# Patient Record
Sex: MALE | Race: WHITE | NOT HISPANIC OR LATINO | Employment: FULL TIME | URBAN - METROPOLITAN AREA
[De-identification: names, ages, dates, MRNs, and addresses within clinical notes are randomized per-mention and may not be internally consistent; named-entity substitution may affect disease eponyms.]

---

## 2018-03-08 ENCOUNTER — OFFICE VISIT CONVERTED (OUTPATIENT)
Dept: ORTHOPEDIC SURGERY | Facility: CLINIC | Age: 48
End: 2018-03-08
Attending: ORTHOPAEDIC SURGERY

## 2018-04-16 ENCOUNTER — OFFICE VISIT CONVERTED (OUTPATIENT)
Dept: ORTHOPEDIC SURGERY | Facility: CLINIC | Age: 48
End: 2018-04-16
Attending: ORTHOPAEDIC SURGERY

## 2018-05-25 ENCOUNTER — CONVERSION ENCOUNTER (OUTPATIENT)
Dept: ORTHOPEDIC SURGERY | Facility: CLINIC | Age: 48
End: 2018-05-25

## 2018-05-25 ENCOUNTER — OFFICE VISIT CONVERTED (OUTPATIENT)
Dept: ORTHOPEDIC SURGERY | Facility: CLINIC | Age: 48
End: 2018-05-25
Attending: ORTHOPAEDIC SURGERY

## 2019-07-12 ENCOUNTER — HOSPITAL ENCOUNTER (OUTPATIENT)
Dept: OTHER | Facility: HOSPITAL | Age: 49
Discharge: HOME | End: 2019-07-12
Attending: NURSE PRACTITIONER

## 2019-07-12 ENCOUNTER — OFFICE VISIT CONVERTED (OUTPATIENT)
Dept: INTERNAL MEDICINE | Facility: CLINIC | Age: 49
End: 2019-07-12
Attending: NURSE PRACTITIONER

## 2019-07-12 LAB
T4 FREE SERPL-MCNC: 1.2 NG/DL (ref 0.9–1.8)
TSH SERPL-ACNC: 2.93 M[IU]/L (ref 0.27–4.2)

## 2019-12-26 ENCOUNTER — OFFICE VISIT CONVERTED (OUTPATIENT)
Dept: INTERNAL MEDICINE | Facility: CLINIC | Age: 49
End: 2019-12-26
Attending: NURSE PRACTITIONER

## 2019-12-26 ENCOUNTER — HOSPITAL ENCOUNTER (OUTPATIENT)
Dept: OTHER | Facility: HOSPITAL | Age: 49
Discharge: HOME | End: 2019-12-26
Attending: NURSE PRACTITIONER

## 2019-12-26 LAB
T4 FREE SERPL-MCNC: 1.2 NG/DL (ref 0.9–1.8)
TSH SERPL-ACNC: 2.66 M[IU]/L (ref 0.27–4.2)

## 2020-01-23 ENCOUNTER — HOSPITAL ENCOUNTER (OUTPATIENT)
Dept: URGENT CARE | Facility: CLINIC | Age: 50
Discharge: HOME | End: 2020-01-23
Attending: EMERGENCY MEDICINE

## 2020-01-25 LAB — BACTERIA SPEC AEROBE CULT: NORMAL

## 2020-01-31 ENCOUNTER — HOSPITAL ENCOUNTER (OUTPATIENT)
Dept: URGENT CARE | Facility: CLINIC | Age: 50
Discharge: HOME | End: 2020-01-31
Attending: NURSE PRACTITIONER

## 2020-02-20 ENCOUNTER — HOSPITAL ENCOUNTER (OUTPATIENT)
Dept: URGENT CARE | Facility: CLINIC | Age: 50
Discharge: HOME | End: 2020-02-20
Attending: PHYSICIAN ASSISTANT

## 2020-08-17 ENCOUNTER — HOSPITAL ENCOUNTER (OUTPATIENT)
Dept: OTHER | Facility: HOSPITAL | Age: 50
Discharge: HOME | End: 2020-08-17
Attending: NURSE PRACTITIONER

## 2020-08-18 ENCOUNTER — TELEMEDICINE CONVERTED (OUTPATIENT)
Dept: INTERNAL MEDICINE | Facility: CLINIC | Age: 50
End: 2020-08-18
Attending: NURSE PRACTITIONER

## 2020-08-18 LAB
T4 FREE SERPL-MCNC: 1.2 NG/DL (ref 0.9–1.8)
TSH SERPL-ACNC: 2.9 M[IU]/L (ref 0.27–4.2)

## 2020-11-03 ENCOUNTER — HOSPITAL ENCOUNTER (OUTPATIENT)
Dept: OTHER | Facility: HOSPITAL | Age: 50
Discharge: HOME | End: 2020-11-03
Attending: NURSE PRACTITIONER

## 2020-11-03 ENCOUNTER — OFFICE VISIT CONVERTED (OUTPATIENT)
Dept: INTERNAL MEDICINE | Facility: CLINIC | Age: 50
End: 2020-11-03
Attending: NURSE PRACTITIONER

## 2020-11-03 LAB
ALBUMIN SERPL-MCNC: 4.4 G/DL (ref 3.5–5)
ALBUMIN/GLOB SERPL: 1.6 {RATIO} (ref 1.4–2.6)
ALP SERPL-CCNC: 74 U/L (ref 53–128)
ALT SERPL-CCNC: 44 U/L (ref 10–40)
ANION GAP SERPL CALC-SCNC: 13 MMOL/L (ref 8–19)
AST SERPL-CCNC: 34 U/L (ref 15–50)
BASOPHILS # BLD AUTO: 0.05 10*3/UL (ref 0–0.2)
BASOPHILS NFR BLD AUTO: 0.7 % (ref 0–3)
BILIRUB SERPL-MCNC: 0.43 MG/DL (ref 0.2–1.3)
BUN SERPL-MCNC: 21 MG/DL (ref 5–25)
BUN/CREAT SERPL: 16 {RATIO} (ref 6–20)
CALCIUM SERPL-MCNC: 9.6 MG/DL (ref 8.7–10.4)
CHLORIDE SERPL-SCNC: 105 MMOL/L (ref 99–111)
CHOLEST SERPL-MCNC: 178 MG/DL (ref 107–200)
CHOLEST/HDLC SERPL: 4.8 {RATIO} (ref 3–6)
CONV ABS IMM GRAN: 0.01 10*3/UL (ref 0–0.2)
CONV CO2: 24 MMOL/L (ref 22–32)
CONV IMMATURE GRAN: 0.1 % (ref 0–1.8)
CONV TOTAL PROTEIN: 7.1 G/DL (ref 6.3–8.2)
CREAT UR-MCNC: 1.32 MG/DL (ref 0.7–1.2)
DEPRECATED RDW RBC AUTO: 43.8 FL (ref 35.1–43.9)
EOSINOPHIL # BLD AUTO: 0.13 10*3/UL (ref 0–0.7)
EOSINOPHIL # BLD AUTO: 1.9 % (ref 0–7)
ERYTHROCYTE [DISTWIDTH] IN BLOOD BY AUTOMATED COUNT: 13.1 % (ref 11.6–14.4)
GFR SERPLBLD BASED ON 1.73 SQ M-ARVRAT: >60 ML/MIN/{1.73_M2}
GLOBULIN UR ELPH-MCNC: 2.7 G/DL (ref 2–3.5)
GLUCOSE SERPL-MCNC: 109 MG/DL (ref 70–99)
HCT VFR BLD AUTO: 51.2 % (ref 42–52)
HDLC SERPL-MCNC: 37 MG/DL (ref 40–60)
HGB BLD-MCNC: 16.4 G/DL (ref 14–18)
LDLC SERPL CALC-MCNC: 118 MG/DL (ref 70–100)
LYMPHOCYTES # BLD AUTO: 1.78 10*3/UL (ref 1–5)
LYMPHOCYTES NFR BLD AUTO: 26.4 % (ref 20–45)
MCH RBC QN AUTO: 29.3 PG (ref 27–31)
MCHC RBC AUTO-ENTMCNC: 32 G/DL (ref 33–37)
MCV RBC AUTO: 91.6 FL (ref 80–96)
MONOCYTES # BLD AUTO: 0.48 10*3/UL (ref 0.2–1.2)
MONOCYTES NFR BLD AUTO: 7.1 % (ref 3–10)
NEUTROPHILS # BLD AUTO: 4.28 10*3/UL (ref 2–8)
NEUTROPHILS NFR BLD AUTO: 63.8 % (ref 30–85)
NRBC CBCN: 0 % (ref 0–0.7)
OSMOLALITY SERPL CALC.SUM OF ELEC: 290 MOSM/KG (ref 273–304)
PLATELET # BLD AUTO: 329 10*3/UL (ref 130–400)
PMV BLD AUTO: 10 FL (ref 9.4–12.4)
POTASSIUM SERPL-SCNC: 4.4 MMOL/L (ref 3.5–5.3)
RBC # BLD AUTO: 5.59 10*6/UL (ref 4.7–6.1)
SODIUM SERPL-SCNC: 138 MMOL/L (ref 135–147)
TRIGL SERPL-MCNC: 115 MG/DL (ref 40–150)
TSH SERPL-ACNC: 4 M[IU]/L (ref 0.27–4.2)
VLDLC SERPL-MCNC: 23 MG/DL (ref 5–37)
WBC # BLD AUTO: 6.73 10*3/UL (ref 4.8–10.8)

## 2021-02-02 ENCOUNTER — CONVERSION ENCOUNTER (OUTPATIENT)
Dept: INTERNAL MEDICINE | Facility: CLINIC | Age: 51
End: 2021-02-02

## 2021-02-02 ENCOUNTER — OFFICE VISIT CONVERTED (OUTPATIENT)
Dept: INTERNAL MEDICINE | Facility: CLINIC | Age: 51
End: 2021-02-02
Attending: NURSE PRACTITIONER

## 2021-02-02 ENCOUNTER — HOSPITAL ENCOUNTER (OUTPATIENT)
Dept: OTHER | Facility: HOSPITAL | Age: 51
Discharge: HOME | End: 2021-02-02
Attending: NURSE PRACTITIONER

## 2021-04-20 ENCOUNTER — HOSPITAL ENCOUNTER (OUTPATIENT)
Dept: OTHER | Facility: HOSPITAL | Age: 51
Discharge: HOME | End: 2021-04-20
Attending: NURSE PRACTITIONER

## 2021-04-20 LAB
ALBUMIN SERPL-MCNC: 4.2 G/DL (ref 3.5–5)
ALBUMIN/GLOB SERPL: 1.4 {RATIO} (ref 1.4–2.6)
ALP SERPL-CCNC: 73 U/L (ref 53–128)
ALT SERPL-CCNC: 36 U/L (ref 10–40)
ANION GAP SERPL CALC-SCNC: 13 MMOL/L (ref 8–19)
AST SERPL-CCNC: 25 U/L (ref 15–50)
BASOPHILS # BLD AUTO: 0.06 10*3/UL (ref 0–0.2)
BASOPHILS NFR BLD AUTO: 0.6 % (ref 0–3)
BILIRUB SERPL-MCNC: 0.27 MG/DL (ref 0.2–1.3)
BUN SERPL-MCNC: 15 MG/DL (ref 5–25)
BUN/CREAT SERPL: 13 {RATIO} (ref 6–20)
CALCIUM SERPL-MCNC: 9.6 MG/DL (ref 8.7–10.4)
CHLORIDE SERPL-SCNC: 102 MMOL/L (ref 99–111)
CHOLEST SERPL-MCNC: 158 MG/DL (ref 107–200)
CHOLEST/HDLC SERPL: 4.2 {RATIO} (ref 3–6)
CONV ABS IMM GRAN: 0.03 10*3/UL (ref 0–0.2)
CONV CO2: 27 MMOL/L (ref 22–32)
CONV IMMATURE GRAN: 0.3 % (ref 0–1.8)
CONV TOTAL PROTEIN: 7.2 G/DL (ref 6.3–8.2)
CREAT UR-MCNC: 1.2 MG/DL (ref 0.7–1.2)
DEPRECATED RDW RBC AUTO: 42.3 FL (ref 35.1–43.9)
EOSINOPHIL # BLD AUTO: 0.11 10*3/UL (ref 0–0.7)
EOSINOPHIL # BLD AUTO: 1.1 % (ref 0–7)
ERYTHROCYTE [DISTWIDTH] IN BLOOD BY AUTOMATED COUNT: 12.9 % (ref 11.6–14.4)
GFR SERPLBLD BASED ON 1.73 SQ M-ARVRAT: >60 ML/MIN/{1.73_M2}
GLOBULIN UR ELPH-MCNC: 3 G/DL (ref 2–3.5)
GLUCOSE SERPL-MCNC: 116 MG/DL (ref 70–99)
HCT VFR BLD AUTO: 51 % (ref 42–52)
HDLC SERPL-MCNC: 38 MG/DL (ref 40–60)
HGB BLD-MCNC: 16.8 G/DL (ref 14–18)
LDLC SERPL CALC-MCNC: 73 MG/DL (ref 70–100)
LYMPHOCYTES # BLD AUTO: 2.5 10*3/UL (ref 1–5)
LYMPHOCYTES NFR BLD AUTO: 24.3 % (ref 20–45)
MCH RBC QN AUTO: 29.7 PG (ref 27–31)
MCHC RBC AUTO-ENTMCNC: 32.9 G/DL (ref 33–37)
MCV RBC AUTO: 90.3 FL (ref 80–96)
MONOCYTES # BLD AUTO: 0.69 10*3/UL (ref 0.2–1.2)
MONOCYTES NFR BLD AUTO: 6.7 % (ref 3–10)
NEUTROPHILS # BLD AUTO: 6.89 10*3/UL (ref 2–8)
NEUTROPHILS NFR BLD AUTO: 67 % (ref 30–85)
NRBC CBCN: 0 % (ref 0–0.7)
OSMOLALITY SERPL CALC.SUM OF ELEC: 288 MOSM/KG (ref 273–304)
PLATELET # BLD AUTO: 326 10*3/UL (ref 130–400)
PMV BLD AUTO: 9.9 FL (ref 9.4–12.4)
POTASSIUM SERPL-SCNC: 4.2 MMOL/L (ref 3.5–5.3)
RBC # BLD AUTO: 5.65 10*6/UL (ref 4.7–6.1)
SODIUM SERPL-SCNC: 138 MMOL/L (ref 135–147)
TRIGL SERPL-MCNC: 237 MG/DL (ref 40–150)
TSH SERPL-ACNC: 4.31 M[IU]/L (ref 0.27–4.2)
VLDLC SERPL-MCNC: 47 MG/DL (ref 5–37)
WBC # BLD AUTO: 10.28 10*3/UL (ref 4.8–10.8)

## 2021-04-21 ENCOUNTER — HOSPITAL ENCOUNTER (OUTPATIENT)
Dept: OTHER | Facility: HOSPITAL | Age: 51
Setting detail: RECURRING SERIES
Discharge: HOME | End: 2021-04-26
Attending: NURSE PRACTITIONER

## 2021-05-13 NOTE — PROGRESS NOTES
"   Progress Note      Patient Name: Sivakumar Gray   Patient ID: [id number]   Sex: Male   YOB: 1970    Primary Care Provider: Luz GE    Visit Date: August 18, 2020    Provider: JOO Cole   Location: [facility]   Location Address: [address] [place] [address], [address]  [place], [place]  [id number]   Location Phone: [phone]          Chief Complaint  · \"I would like to discuss changing my medication\"      History Of Present Illness  Video Conferencing Visit  Sivakumar Gray is a 50 year old male who is presenting for evaluation via video conferencing via [employer]. Verbal consent obtained before beginning visit.   The following staff were present during this visit: Luz Minor NP.   Sivakumar Gray is a 50 year old male who presents for evaluation and treatment of:      He has some concerns regarding Synthroid.  He states that since being on it he feels like in the afternoon, he becomes very fatigued.  He feels like this is related to Synthroid.  He is talk to both his mother and brother who had similar issues with synthetic thyroid replacement.  He reports that they switch to natural thyroid hormone and feels significantly better.    Other than the fatigue, he denies any other symptoms         Past Medical History  Disease Name Date Onset Notes   ACL tear, left 03/09/2018 --    Aftercare following Left knee arthroscopic revision ACL reconstruction with allograft and partial medial meniscectomy and chondroplasty 04/03/2018 05/26/2018 --    Lateral meniscus tear, Left 03/09/2018 --    Left knee pain, unspecified chronicity 03/09/2018 --    Limb Swelling --  --    Medial meniscus tear, Left 03/09/2018 --    Shortness of Breath --  --    Thyroid disorder --  --          Medication List  Name Date Started Instructions   clotrimazole 1 % topical cream 07/12/2019 apply to the affected and surrounding areas of skin by topical route 2 times per day in the morning and evening "   Synthroid 75 mcg oral tablet 01/16/2020 take 1 tablet (75 mcg) by oral route once daily for 90 days         Allergy List  Allergen Name Date Reaction Notes   amoxicillin --  --  --          Family Medical History  Disease Name Relative/Age Notes   Family history of certain chronic disabling diseases; arthritis Sister/   Sister         Social History  Finding Status Start/Stop Quantity Notes   Alcohol Use Current some day --/-- --  rarely drinks, less than 1 drink per day, has been drinking for 21-30 years   lives with spouse --  --/-- --  --    . --  --/-- --  --    Recreational Drug Use Never --/-- --  no   Tobacco Never --/-- --  never smoker   Working --  --/-- --  --          Immunizations  NameDate Admin Mfg Trade Name Lot Number Route Inj VIS Given VIS Publication   [id number]/17/2019 [facility] Fluzone Quadrivalent [id number] IM LD 10/17/2019    Comments: pt tolerated well         Review of Systems  · Constitutional  o Denies  o : fever, fatigue, weight loss, weight gain  · Cardiovascular  o Denies  o : lower extremity edema, claudication, chest pressure, palpitations  · Respiratory  o Denies  o : shortness of breath, wheezing, cough, hemoptysis, dyspnea on exertion  · Gastrointestinal  o Denies  o : nausea, vomiting, diarrhea, constipation, abdominal pain      Physical Examination     Gen: well-nourished, no acute distress  HENT: atraumatic, normocephalic  Eyes: extraocular movements intact, no scleral icterus  Lung: breathing comfortably, no cough  Skin: no visible rash, no lesions  Neuro: grossly oriented to person, place, and time. no facial droop   Psych: normal mood and affect             Assessment  · Hypothyroidism     244.9/E03.9  We will trial him on Platinum Thyroid at 30 mg. We will stop the Synthroid and start Platinum. We will repeat thyroid levels in 6 to 8 weeks and have a follow-up at that time.    Problems Reconciled  Plan  · Orders  o Thyroid Profile (59217, 95128, THYII) - 244.9/E03.9 -  09/29/2020  o ACO-39: Current medications updated and reviewed () - - 08/18/2020  · Medications  o [place] Thyroid 30 mg oral tablet   SIG: take 1 tablet (30 mg) by oral route once daily for 90 days   DISP: (90) tablets with 0 refills  Prescribed on 08/18/2020     o Synthroid 75 mcg oral tablet   SIG: take 1 tablet (75 mcg) by oral route once daily for 90 days   DISP: (90) tablets with 1 refills  Discontinued on 08/18/2020     o Medications have been Reconciled  o Transition of Care or Provider Policy  · Instructions  o Patient was educated/instructed on their diagnosis, treatment and medications prior to discharge from the clinic today.  o Call the office with any concerns or questions.  · Disposition  o Call or Return if symptoms worsen or persist.  o Follow up in 2 months  o Labs to be printed  o Prescriptions sent electronically            Electronically Signed by: JOO Cole -Author on August 18, 2020 08:27:47 AM

## 2021-05-13 NOTE — PROGRESS NOTES
Progress Note      Patient Name: Sivakumar Gray   Patient ID: [id number]   Sex: Male   YOB: 1970    Primary Care Provider: Luz GE    Visit Date: November 3, 2020    Provider: JOO Cole   Location: [facility]   Location Address: [address] [place] [address], [facility]  [place], [place]  [id number]   Location Phone: [phone]          Chief Complaint  · Hypothyroidism   · annual physica      History Of Present Illness  Sivakumar Gray is a 50 year old male who presents for evaluation and treatment of:      annual physical    left knee pain, he has hx of ACL repair. He reports feeling like it his knee is buckling in the last few wks. he has been using ice. taking aleve prn. improved since initial injury. it is not bothering him to the point he is will to have surgery on this. no issue going up and down stairs    since changing from Synthroid, he has not had as much hairloss and did lose some weight. feeling much better on armour    already had flu vaccine       Past Medical History  Disease Name Date Onset Notes   ACL tear, left 03/09/2018 --    Aftercare following Left knee arthroscopic revision ACL reconstruction with allograft and partial medial meniscectomy and chondroplasty 04/03/2018 05/26/2018 --    Lateral meniscus tear, Left 03/09/2018 --    Left knee pain, unspecified chronicity 03/09/2018 --    Limb Swelling --  --    Medial meniscus tear, Left 03/09/2018 --    Shortness of Breath --  --    Thyroid disorder --  --          Medication List  Name Date Started Instructions   [place] Thyroid 30 mg oral tablet 11/03/2020 take 1 tablet (30 mg) by oral route once daily for 90 days   clotrimazole 1 % topical cream 07/12/2019 apply to the affected and surrounding areas of skin by topical route 2 times per day in the morning and evening         Allergy List  Allergen Name Date Reaction Notes   amoxicillin --  --  --        Allergies Reconciled  Family Medical  "History  Disease Name Relative/Age Notes   Family history of certain chronic disabling diseases; arthritis Sister/   Sister         Social History  Finding Status Start/Stop Quantity Notes   Alcohol Use Current some day --/-- --  rarely drinks, less than 1 drink per day, has been drinking for 21-30 years   lives with spouse --  --/-- --  --    . --  --/-- --  --    Recreational Drug Use Never --/-- --  no   Tobacco Never --/-- --  never smoker   Working --  --/-- --  --          Immunizations  NameDate Admin Mfg Trade Name Lot Number Route Inj VIS Given VIS Publication   [id number]/17/2019 [facility] Fluzone Quadrivalent [id number] IM LD 10/17/2019    Comments: pt tolerated well         Review of Systems  · Constitutional  o Denies  o : fever, fatigue, weight loss, weight gain  · Cardiovascular  o Denies  o : lower extremity edema, claudication, chest pressure, palpitations  · Respiratory  o Denies  o : shortness of breath, wheezing, cough, hemoptysis, dyspnea on exertion  · Gastrointestinal  o Denies  o : nausea, vomiting, diarrhea, constipation, abdominal pain      Vitals  Date Time BP Position Site L\R Cuff Size HR RR TEMP (F) WT  HT  BMI kg/m2 BSA m2 O2 Sat FR L/min FiO2 HC       07/12/2019 02:35 /82 Sitting    73 - R 15 97.6 203lbs 16oz 5'  6.5\" 32.43 2.08 96 %      12/26/2019 08:40 /66 Sitting    77 - R 14 98.6 200lbs 6oz 5'  6\" 32.34 2.06 95 %  21%    11/03/2020 08:48 /80 Sitting    77 - R  97.6 198lbs 2oz 5'  6\" 31.98 2.05 96 %  21%          Physical Examination  · Constitutional  o Appearance  o : no acute distress, well-nourished  · Head and Face  o Head  o :   § Inspection  § : atraumatic, normocephalic  · Ears, Nose, Mouth and Throat  o Ears  o :   § External Ears  § : normal  § Otoscopic Examination  § : tympanic membrane appearance within normal limits bilaterally  o Nose  o :   § Intranasal Exam  § : nares patent  o Oral Cavity  o :   § Oral Mucosa  § : moist mucous " membranes  · Neck  o Thyroid  o : gland size normal, nontender, no nodules or masses present on palpation, symmetric  · Respiratory  o Respiratory Effort  o : breathing comfortably, symmetric chest rise  o Auscultation of Lungs  o : clear to asculatation bilaterally, no wheezes, rales, or rhonchii  · Cardiovascular  o Heart  o :   § Auscultation of Heart  § : regular rate and rhythm, no murmurs, rubs, or gallops  o Peripheral Vascular System  o :   § Extremities  § : no edema  · Lymphatic  o Neck  o : no lymphadenopathy present  · Skin and Subcutaneous Tissue  o General Inspection  o : no lesions present, no areas of discoloration, skin turgor normal  · Neurologic  o Mental Status Examination  o :   § Orientation  § : grossly oriented to person, place and time  o Gait and Station  o :   § Gait Screening  § : normal gait          Assessment  · Annual physical exam     V70.0/Z00.00  will check labs today  · Hypothyroidism     244.9/E03.9  labs today, doing well with armour  · Left knee pain, unspecified chronicity     719.46/M25.562  he will continue with ice, PT on his own, bracing, and nsaids prn. If pain worsens, he will call for further eval with imaging.    Problems Reconciled  Plan  · Orders  o Physical, Primary Care Panel (CBC, CMP, Lipid, TSH) [facility] (21594, 58378, 54094, 33431) - V70.0/Z00.00 - 11/03/2020  o ACO-39: Current medications updated and reviewed (1159F, ) - - 11/03/2020  · Medications  o Medications have been Reconciled  o Transition of Care or Provider Policy  · Instructions  o Reviewed health maintenance flowsheet and updated information. Orders were placed and/or patient's response was documented.  o Patient was educated/instructed on their diagnosis, treatment and medications prior to discharge from the clinic today.  o Call the office with any concerns or questions.  · Disposition  o Call or Return if symptoms worsen or persist.  o labs drawn in house today            Electronically Signed  by: Luz Minor, APRN -Author on December 15, 2020 05:28:35 PM

## 2021-05-14 VITALS
SYSTOLIC BLOOD PRESSURE: 122 MMHG | HEART RATE: 70 BPM | DIASTOLIC BLOOD PRESSURE: 76 MMHG | TEMPERATURE: 97.8 F | WEIGHT: 201.37 LBS | BODY MASS INDEX: 32.36 KG/M2 | HEIGHT: 66 IN | OXYGEN SATURATION: 95 %

## 2021-05-14 VITALS
SYSTOLIC BLOOD PRESSURE: 116 MMHG | OXYGEN SATURATION: 96 % | HEART RATE: 77 BPM | BODY MASS INDEX: 31.84 KG/M2 | TEMPERATURE: 97.6 F | HEIGHT: 66 IN | WEIGHT: 198.12 LBS | DIASTOLIC BLOOD PRESSURE: 80 MMHG

## 2021-05-14 NOTE — PROGRESS NOTES
"   Progress Note      Patient Name: Sivakumar Gray   Patient ID: [id number]   Sex: Male   YOB: 1970    Primary Care Provider: Luz GE    Visit Date: February 2, 2021    Provider: JOO Cole   Location: [facility]   Location Address: [address] [place] [address], [address]  [place], [place]  [id number]   Location Phone: [phone]          Chief Complaint  · Acute visit  · \"I need a dermatology referral\"  · \"I've been having some pain in my right knee\"      History Of Present Illness  Sivakumar Gray is a 50 year old male who presents for evaluation and treatment of:      rash present x2 wks, initially thought this was poison ivy that then got infected  was seen in the ED and dx with impetigo. finished steroids and has been using mupirocin  he reports some improvement but is still very inflamed, warm to touch  denies significant pain but reports some itching  already has appt with derm spec but needing referral    knee, right-hurting on medial border x1 mth  denies injury but has been working out, doing squats  pain with walking and especially with stairs  instability with extension during ambulation    AK of left ear       Past Medical History  Disease Name Date Onset Notes   ACL tear, left 03/09/2018 --    Aftercare following Left knee arthroscopic revision ACL reconstruction with allograft and partial medial meniscectomy and chondroplasty 04/03/2018 05/26/2018 --    Lateral meniscus tear, Left 03/09/2018 --    Left knee pain, unspecified chronicity 03/09/2018 --    Limb Swelling --  --    Medial meniscus tear, Left 03/09/2018 --    Shortness of Breath --  --    Thyroid disorder --  --          Medication List  Name Date Started Instructions   [place] Thyroid 30 mg oral tablet 11/03/2020 take 1 tablet (30 mg) by oral route once daily for 90 days   clotrimazole 1 % topical cream 07/12/2019 apply to the affected and surrounding areas of skin by topical route 2 times per day in " "the morning and evening   mupirocin 2 % topical ointment 01/25/2021 apply a small amount to the affected area by topical route 3 times per day for 5 days         Allergy List  Allergen Name Date Reaction Notes   amoxicillin --  --  --        Allergies Reconciled  Family Medical History  Disease Name Relative/Age Notes   Family history of certain chronic disabling diseases; arthritis Sister/   Sister         Social History  Finding Status Start/Stop Quantity Notes   Alcohol Use Current some day --/-- --  rarely drinks, less than 1 drink per day, has been drinking for 21-30 years   lives with spouse --  --/-- --  --    . --  --/-- --  --    Recreational Drug Use Never --/-- --  no   Tobacco Never --/-- --  never smoker   Working --  --/-- --  --          Immunizations  NameDate Admin Mfg Trade Name Lot Number Route Inj VIS Given VIS Publication   [id number]/17/2019 [facility] Fluzone Quadrivalent [id number] IM LD 10/17/2019    Comments: pt tolerated well         Review of Systems  · Constitutional  o Denies  o : fever, fatigue, weight loss, weight gain  · Cardiovascular  o Denies  o : lower extremity edema, claudication, chest pressure, palpitations  · Respiratory  o Denies  o : shortness of breath, wheezing, cough, hemoptysis, dyspnea on exertion  · Gastrointestinal  o Denies  o : nausea, vomiting, diarrhea, constipation, abdominal pain      Vitals  Date Time BP Position Site L\R Cuff Size HR RR TEMP (F) WT  HT  BMI kg/m2 BSA m2 O2 Sat FR L/min FiO2 HC       12/26/2019 08:40 /66 Sitting    77 - R 14 98.6 200lbs 6oz 5'  6\" 32.34 2.06 95 %  21%    11/03/2020 08:48 /80 Sitting    77 - R  97.6 198lbs 2oz 5'  6\" 31.98 2.05 96 %  21%    02/02/2021 10:14 /76 Sitting    70 - R  97.8 201lbs 6oz 5'  6\" 32.5 2.06 95 %  21%          Physical Examination  · Constitutional  o Appearance  o : no acute distress, well-nourished  · Head and Face  o Head  o :   § Inspection  § : atraumatic, " normocephalic  · Eyes  o Eyes  o : extraocular movements intact, no scleral icterus, no conjunctival injection  · Ears, Nose, Mouth and Throat  o Ears  o :   § External Ears  § : normal  o Nose  o :   § Intranasal Exam  § : nares patent  o Oral Cavity  o :   § Oral Mucosa  § : moist mucous membranes  · Respiratory  o Respiratory Effort  o : breathing comfortably, symmetric chest rise  o Auscultation of Lungs  o : clear to asculatation bilaterally, no wheezes, rales, or rhonchii  · Cardiovascular  o Heart  o :   § Auscultation of Heart  § : regular rate and rhythm, no murmurs, rubs, or gallops  o Peripheral Vascular System  o :   § Extremities  § : no edema  · Lymphatic  o Neck  o : no lymphadenopathy present  · Skin and Subcutaneous Tissue  o General Inspection  o : AK of left ear, erythema of bilateral forearms with deroofed lesions without drainage, crusting  · Neurologic  o Mental Status Examination  o :   § Orientation  § : grossly oriented to person, place and time  o Gait and Station  o :   § Gait Screening  § : normal gait  · Psychiatric  o General  o : normal mood and affect  · Left Knee  o Inspection  o : no redness, swelling, deformity, bruising, or effusion  o Palpation  o : tenderness to palpation medial joint line, no crepitus  o Range of Motion  o : normal range of motion  o Reflexes  o : normal reflexes          Assessment  · Rash and nonspecific skin eruption     782.1/R21  appt already scheduled with derm. will place referral for ins  · Knee pain, acute     719.46/M25.569  xray in office today. will schedule for PT. home PT until that time.  · Actinic keratitis     370.24/H16.139  Cryotherapy performed today after risk-benefit discussion and consent. Lesions sprayed with liquid nitrogen for 5 pulsatile sec x 3 rounds.     Problems Reconciled  Plan  · Orders  o ACO-39: Current medications updated and reviewed (1159F, ) - - 02/02/2021  o DERMATOLOGY CONSULTATION ([facility]) - 782.1/R21 - 02/02/2021    has appt at derm specialist on 2/4/21, just needing referral  o Knee (Right) 3 views X-Ray [facility] ([id number]) - 719.46/M25.569 - 02/02/2021  o PHYSICAL THERAPY CONSULTATION (formerly [facility]) - 719.46/M25.569 - 02/02/2021  · Medications  o Medications have been Reconciled  o Transition of Care or Provider Policy  · Instructions  o Patient was educated/instructed on their diagnosis, treatment and medications prior to discharge from the clinic today.  o Call the office with any concerns or questions.  · Disposition  o Call or Return if symptoms worsen or persist.  o Xray performed in clinic            Electronically Signed by: JOO Cole -Author on February 2, 2021 01:22:27 PM

## 2021-05-15 VITALS
DIASTOLIC BLOOD PRESSURE: 66 MMHG | WEIGHT: 200.37 LBS | SYSTOLIC BLOOD PRESSURE: 120 MMHG | HEART RATE: 77 BPM | RESPIRATION RATE: 14 BRPM | TEMPERATURE: 98.6 F | HEIGHT: 66 IN | OXYGEN SATURATION: 95 % | BODY MASS INDEX: 32.2 KG/M2

## 2021-05-15 VITALS
BODY MASS INDEX: 32.78 KG/M2 | SYSTOLIC BLOOD PRESSURE: 126 MMHG | RESPIRATION RATE: 15 BRPM | HEART RATE: 73 BPM | OXYGEN SATURATION: 96 % | DIASTOLIC BLOOD PRESSURE: 82 MMHG | WEIGHT: 204 LBS | HEIGHT: 66 IN | TEMPERATURE: 97.6 F

## 2021-05-16 VITALS — HEART RATE: 81 BPM | HEIGHT: 66 IN | OXYGEN SATURATION: 98 %

## 2021-05-16 VITALS — HEIGHT: 66 IN | HEART RATE: 85 BPM | OXYGEN SATURATION: 98 % | WEIGHT: 196.5 LBS | BODY MASS INDEX: 31.58 KG/M2

## 2021-05-16 VITALS — HEART RATE: 80 BPM | WEIGHT: 199.12 LBS | BODY MASS INDEX: 32 KG/M2 | OXYGEN SATURATION: 98 % | HEIGHT: 66 IN

## 2021-06-10 ENCOUNTER — TELEPHONE (OUTPATIENT)
Dept: INTERNAL MEDICINE | Facility: CLINIC | Age: 51
End: 2021-06-10

## 2021-06-10 NOTE — TELEPHONE ENCOUNTER
Discussed with patient wife, appointment to be made for upcoming weeks for shoulder pain that started two months ago

## 2021-06-10 NOTE — TELEPHONE ENCOUNTER
Caller: Sivakumar Gray    Relationship to patient: Self    Best call back number: [phone]    Patient is needing: PATIENT MET SAME DAY REQUIREMENTS BUT THERE WERE NO SAME DAY APPOINTMENTS AVAILABLE. ATTEMPTED TO WARM TRANSFER AND WAS SENT STRAIGHT TO A VOICEMAIL

## 2021-06-16 DIAGNOSIS — E03.9 HYPOTHYROIDISM, UNSPECIFIED TYPE: Primary | ICD-10-CM

## 2021-06-16 NOTE — TELEPHONE ENCOUNTER
Caller: Sivakumar Gray    Relationship: Self    Best call back number:[phone]     Medication needed:   Requested Prescriptions      No prescriptions requested or ordered in this encounter   THYROID ARMOUR 30 MG    When do you need the refill by: 4 DAYS    What additional details did the patient provide when requesting the medication:     Does the patient have less than a 3 day supply:  [] Yes  [x] No    What is the patient's preferred pharmacy: [id number] - [place], [place] - [address] AT [address]/[facility] & [place] - [phone] [facility] [phone] FX

## 2021-06-17 RX ORDER — THYROID 30 MG/1
1 TABLET ORAL DAILY
COMMUNITY
Start: 2021-04-29 | End: 2021-06-17 | Stop reason: SDUPTHER

## 2021-06-17 NOTE — TELEPHONE ENCOUNTER
Luz Patient      LOV: 02/02/21   Last TSH on 04/20/2021 was 4.310 H  Has pending thyroid profile.     Please review and approve/deny medication request.

## 2021-06-18 RX ORDER — THYROID 30 MG/1
30 TABLET ORAL DAILY
Qty: 30 TABLET | Refills: 2 | Status: SHIPPED | OUTPATIENT
Start: 2021-06-18 | End: 2021-08-05 | Stop reason: SDUPTHER

## 2021-07-02 ENCOUNTER — OFFICE VISIT (OUTPATIENT)
Dept: INTERNAL MEDICINE | Facility: CLINIC | Age: 51
End: 2021-07-02

## 2021-07-02 VITALS
SYSTOLIC BLOOD PRESSURE: 128 MMHG | HEIGHT: 66 IN | WEIGHT: 199.5 LBS | HEART RATE: 86 BPM | OXYGEN SATURATION: 98 % | TEMPERATURE: 97.6 F | BODY MASS INDEX: 32.06 KG/M2 | DIASTOLIC BLOOD PRESSURE: 86 MMHG

## 2021-07-02 DIAGNOSIS — E03.9 HYPOTHYROIDISM, UNSPECIFIED TYPE: ICD-10-CM

## 2021-07-02 DIAGNOSIS — E78.2 MIXED HYPERLIPIDEMIA: ICD-10-CM

## 2021-07-02 DIAGNOSIS — S43.432A LABRAL TEAR OF SHOULDER, LEFT, INITIAL ENCOUNTER: ICD-10-CM

## 2021-07-02 DIAGNOSIS — M25.512 ACUTE PAIN OF LEFT SHOULDER: Primary | ICD-10-CM

## 2021-07-02 PROCEDURE — 99214 OFFICE O/P EST MOD 30 MIN: CPT | Performed by: PHYSICIAN ASSISTANT

## 2021-07-02 RX ORDER — NAPROXEN 500 MG/1
500 TABLET ORAL 2 TIMES DAILY WITH MEALS
Qty: 60 TABLET | Refills: 1 | Status: SHIPPED | OUTPATIENT
Start: 2021-07-02 | End: 2022-12-15

## 2021-07-02 NOTE — PROGRESS NOTES
"Chief Complaint  Follow-up, Shoulder Pain (has been hvaing pain for over a month ), and Elbow Pain    Subjective          Sivakumar Gray presents to [facility]  Pt was splitting wood last month and starting having issues in L shoulder  Denies numbness/tingling down into arm  Denies neck pain.   Pain worse when laying on his side or if he tries to lift.   He states pain is sharp. Also has pain with L elbow.   He has been trying PT on how at home.     Hypothyroid: denies cp, palpitations, dizziness  No issues taking medicine    HLD: trying lifestyle changes      Past Medical History:   Diagnosis Date   • Acute lateral meniscal tear, left, initial encounter 03/09/2018   • Condition not found 05/26/2018    aftercare following left knee arthroscopic revision ACL reconstruction with allograft and partial medial meniscectomy and chondroplasty 4/3/2018   • Left ACL tear 03/09/2018   • Left knee pain 03/09/2018    unspecified chronicity   • Limb swelling    • Medial meniscus tear 03/09/2018   • SOB (shortness of breath)    • Thyroid disorder         History reviewed. No pertinent surgical history.     Current Outpatient Medications on File Prior to Visit   Medication Sig Dispense Refill   • [place] Thyroid 30 MG tablet Take 1 tablet by mouth Daily. 30 tablet 2     No current facility-administered medications on file prior to visit.        Allergies   Allergen Reactions   • Amoxicillin Hives       Social History     Tobacco Use   Smoking Status Never Smoker   Smokeless Tobacco Never Used          Objective   Vital Signs:   /86   Pulse 86   Temp 97.6 °F (36.4 °C)   Ht 167.6 cm (66\")   Wt 90.5 kg (199 lb 8 oz)   SpO2 98%   BMI 32.20 kg/m²     Physical Exam  Vitals reviewed.   Constitutional:       Appearance: Normal appearance.   HENT:      Head: Normocephalic and atraumatic.      Nose: Nose normal.      Mouth/Throat:      Mouth: Mucous membranes are moist.   Eyes:      " Extraocular Movements: Extraocular movements intact.      Conjunctiva/sclera: Conjunctivae normal.      Pupils: Pupils are equal, round, and reactive to light.   Cardiovascular:      Rate and Rhythm: Normal rate and regular rhythm.   Pulmonary:      Effort: Pulmonary effort is normal.      Breath sounds: Normal breath sounds.   Abdominal:      General: Abdomen is flat. Bowel sounds are normal.      Palpations: Abdomen is soft.   Musculoskeletal:      Right shoulder: Normal.      Left shoulder: Decreased range of motion. Decreased strength.      Comments: Positive empty can test  C/o pain with abduction of L shoulder   Neurological:      General: No focal deficit present.      Mental Status: He is alert and oriented to person, place, and time.   Psychiatric:         Mood and Affect: Mood normal.        Result Review :                 Assessment and Plan    Diagnoses and all orders for this visit:    1. Acute pain of left shoulder (Primary)  Comments:  Discussed likely rotator cuff injury. XR showing arthritis, will order MRI to further eval. Naproxen prn pain.  Orders:  -     MRI Shoulder Left Without Contrast; Future  -     XR Shoulder 2+ View Left    2. Mixed hyperlipidemia  Assessment & Plan:   Reviewed last labs showing elevated cholesterol. Labs ordered today, will start medication based on results.      Orders:  -     T4, free; Future  -     Comprehensive Metabolic Panel; Future  -     CBC & Differential; Future  -     TSH; Future  -     Lipid Panel; Future    3. Hypothyroidism, unspecified type  Assessment & Plan:  Labs ordered today, will adjust medication based on results.      Orders:  -     T4, free; Future  -     Comprehensive Metabolic Panel; Future  -     CBC & Differential; Future  -     TSH; Future  -     Lipid Panel; Future    Other orders  -     naproxen (Naprosyn) 500 MG tablet; Take 1 tablet by mouth 2 (Two) Times a Day With Meals.  Dispense: 60 tablet; Refill: 1      Follow Up   Return in about 6  weeks (around 8/13/2021).  Patient was given instructions and counseling regarding his condition or for health maintenance advice. Please see specific information pulled into the AVS if appropriate.

## 2021-07-03 NOTE — PATIENT INSTRUCTIONS
Shoulder Pain  Many things can cause shoulder pain, including:  · An injury to the shoulder.  · Overuse of the shoulder.  · Arthritis.  The source of the pain can be:  · Inflammation.  · An injury to the shoulder joint.  · An injury to a tendon, ligament, or bone.  Follow these instructions at home:  Pay attention to changes in your symptoms. Let your health care provider know about them. Follow these instructions to relieve your pain.  If you have a sling:  · Wear the sling as told by your health care provider. Remove it only as told by your health care provider.  · Loosen the sling if your fingers tingle, become numb, or turn cold and blue.  · Keep the sling clean.  · If the sling is not waterproof:  ? Do not let it get wet. Remove it to shower or bathe.  · Move your arm as little as possible, but keep your hand moving to prevent swelling.  Managing pain, stiffness, and swelling    · If directed, put ice on the painful area:  ? Put ice in a plastic bag.  ? Place a towel between your skin and the bag.  ? Leave the ice on for 20 minutes, 2-3 times per day. Stop applying ice if it does not help with the pain.  · Squeeze a soft ball or a foam pad as much as possible. This helps to keep the shoulder from swelling. It also helps to strengthen the arm.  General instructions  · Take over-the-counter and prescription medicines only as told by your health care provider.  · Keep all follow-up visits as told by your health care provider. This is important.  Contact a health care provider if:  · Your pain gets worse.  · Your pain is not relieved with medicines.  · New pain develops in your arm, hand, or fingers.  Get help right away if:  · Your arm, hand, or fingers:  ? Tingle.  ? Become numb.  ? Become swollen.  ? Become painful.  ? Turn white or blue.  Summary  · Shoulder pain can be caused by an injury, overuse, or arthritis.  · Pay attention to changes in your symptoms. Let your health care provider know about  them.  · This condition may be treated with a sling, ice, and pain medicines.  · Contact your health care provider if the pain gets worse or new pain develops. Get help right away if your arm, hand, or fingers tingle or become numb, swollen, or painful.  · Keep all follow-up visits as told by your health care provider. This is important.  This information is not intended to replace advice given to you by your health care provider. Make sure you discuss any questions you have with your health care provider.  Document Revised: 07/02/2019 Document Reviewed: 07/02/2019  Elsevier Patient Education © 2021 [employer]

## 2021-07-03 NOTE — ASSESSMENT & PLAN NOTE
Reviewed last labs showing elevated cholesterol. Labs ordered today, will start medication based on results.

## 2021-07-08 NOTE — PROGRESS NOTES
I have reviewed the notes, assessments, and/or procedures performed by Leighann Dangelo PA-C, I concur with her documentation of Sivakumar Gray.

## 2021-07-23 ENCOUNTER — HOSPITAL ENCOUNTER (OUTPATIENT)
Dept: MRI IMAGING | Facility: HOSPITAL | Age: 51
Discharge: HOME | End: 2021-07-23
Admitting: PHYSICIAN ASSISTANT

## 2021-07-23 DIAGNOSIS — M25.512 ACUTE PAIN OF LEFT SHOULDER: ICD-10-CM

## 2021-07-23 PROCEDURE — 73221 MRI JOINT UPR EXTREM W/O DYE: CPT

## 2021-07-29 ENCOUNTER — OFFICE VISIT (OUTPATIENT)
Dept: ORTHOPEDIC SURGERY | Facility: CLINIC | Age: 51
End: 2021-07-29

## 2021-07-29 VITALS — HEIGHT: 67 IN | RESPIRATION RATE: 14 BRPM | WEIGHT: 186 LBS | BODY MASS INDEX: 29.19 KG/M2

## 2021-07-29 DIAGNOSIS — M77.8 SHOULDER TENDONITIS, LEFT: ICD-10-CM

## 2021-07-29 DIAGNOSIS — M75.112 PARTIAL NONTRAUMATIC TEAR OF ROTATOR CUFF, LEFT: Primary | ICD-10-CM

## 2021-07-29 DIAGNOSIS — M75.42 IMPINGEMENT SYNDROME OF LEFT SHOULDER: ICD-10-CM

## 2021-07-29 PROCEDURE — 99203 OFFICE O/P NEW LOW 30 MIN: CPT | Performed by: ORTHOPAEDIC SURGERY

## 2021-07-29 NOTE — PROGRESS NOTES
"Chief Complaint  Pain of the Left Shoulder     Subjective      Sivakumar Gray presents to [facility] for evaluation of the left shoulder. The patient has been having shoulder pain for about 4 months. He denies any injury or trauma to the shoulder. He reports stiffness and pain in the shoulder. He seen his PCP who ordered an x-ray and an MRI of the shoulder. He has had no treatment for the shoulder. He locates the pain to the anterior lateral shoulder. He denies any popping to the shoulder, admits pain at night. He reports decreased strength.     Allergies   Allergen Reactions   • Amoxicillin Hives        Social History     Socioeconomic History   • Marital status:      Spouse name: Not on file   • Number of children: Not on file   • Years of education: Not on file   • Highest education level: Not on file   Tobacco Use   • Smoking status: Never Smoker   • Smokeless tobacco: Never Used   Vaping Use   • Vaping Use: Never used   Substance and Sexual Activity   • Alcohol use: Yes     Comment: rarely drinks, less than 1 drink per day, has been drinking for 21-30 years   • Drug use: Never        Review of Systems     Objective   Vital Signs:   Resp 14   Ht 168.9 cm (66.5\")   Wt 84.4 kg (186 lb)   BMI 29.57 kg/m²       Physical Exam  Constitutional:       Appearance: Normal appearance. He is well-developed and normal weight.   HENT:      Head: Normocephalic.      Right Ear: Hearing and external ear normal.      Left Ear: Hearing and external ear normal.      Nose: Nose normal.   Eyes:      Conjunctiva/sclera: Conjunctivae normal.   Cardiovascular:      Rate and Rhythm: Normal rate.   Pulmonary:      Effort: Pulmonary effort is normal.      Breath sounds: No wheezing or rales.   Abdominal:      Palpations: Abdomen is soft.      Tenderness: There is no abdominal tenderness.   Musculoskeletal:      Cervical back: Normal range of motion.   Skin:     Findings: No rash.   Neurological:      " Mental Status: He is alert and oriented to person, place, and time.   Psychiatric:         Mood and Affect: Mood and affect normal.         Judgment: Judgment normal.       Ortho Exam      Left shoulder- non-tender. Forward elevation 180. Abduction 165. External Rotation 90. Internal rotation 55 with pain. Positive Vega and Neer. Positive Pike. Internal rotation to T-12. Negative lift off. Mild pain with cross arm adduction.     Procedures      Imaging Results (Most Recent)     None           Result Review :       XR Shoulder 2+ View Left    Result Date: 7/2/2021  Narrative: PROCEDURE: XR SHOULDER 2+ VW LEFT  COMPARISON: None  INDICATIONS: shoulder pain x 2 weeks/nki  FINDINGS:  Mild glenohumeral and AC joint arthrosis.  No fracture or dislocation.  CONCLUSION: Degenerative changes.  No acute findings.      LUCI CALHOUN MD       Electronically Signed and Approved By: LUCI CALHOUN MD on 7/02/2021 at 14:10             MRI Shoulder Left Without Contrast    Result Date: 7/23/2021  Narrative: PROCEDURE: MRI SHOULDER LEFT WO CONTRAST  COMPARISON: None  INDICATIONS: Shoulder pain, rotator cuff disorder suspected, xray done  TECHNIQUE: A variety of imaging planes and parameters were utilized for visualization of suspected pathology.  Images were performed without contrast.   FINDINGS:  Rotator cuff tendinosis is present.  Intermediate signal changes are present with no evidence of a focal defect.  There is a small amount of fraying of the supraspinatus tendon.  Subacromial spurring is present.  There is a small amount of subacromial/subdeltoid bursitis present.  The biceps labral complex is intact. There is no evidence for biceps tendon tear.  A small amount of fluid is seen along the extracapsular portion of the biceps tendon.  Mild to moderate degenerative changes are present within the acromioclavicular joint.  Mild degenerative changes are present within the glenohumeral joint.  No joint effusion identified.  Pan  labral degenerative tearing is present, most pronounced along the posterior labrum which appears mildly displaced.  A. Labral cyst is seen or scar standing from the posterior labrum measuring up to 2.5 x 0.3 cm (series 8, image 6).  This extends along the posterior aspect of the glenoid.  No additional cystic changes identified.  No abnormal focal bone marrow signal is seen. The cortical margins are intact. The volume of the rotator cuff musculature is within normal limits. The surrounding soft tissues are unremarkable.  CONCLUSION:  1. Rotator cuff tendinosis with mild bursal sided fraying of the supraspinatus tendon with a small amount of subacromial/subdeltoid bursitis related to subacromial spurring.  Otherwise, no evidence of tear. 2. Pan labral degenerative tearing, most pronounced along the posterior labrum which demonstrates mild displacement with elongated paralabral cyst formation seen extending along the posterior aspect of the glenoid. 3. Mild to moderate acromioclavicular and mild glenohumeral osteoarthritis. 4. Mild biceps tenosynovitis..     ARTEMIO EDWARDS MD       Electronically Signed and Approved By: ARTEMIO EDWARDS MD on 7/23/2021 at 15:32                      Assessment and Plan     DX: partial rotator cuff tear. Left Shoulder tendonitis. Left shoulder impingement     Discussed the risks and benefits of a Left shoulder injection. The patient expressed understanding and wished to proceed. He tolerated the injection well. Order for physical therapy given today.     Call or return if worsening symptoms.    Follow Up     Follow up in 6 weeks to recheck.       Patient was given instructions and counseling regarding his condition or for health maintenance advice. Please see specific information pulled into the AVS if appropriate.     Scribed for Fabricio Luna MD by Joseline Harding.  07/29/21   16:04 EDT    I have personally performed the services described in this document as scribed by the  above individual and it is both accurate and complete. Fabricio Luna MD 07/29/21

## 2021-08-03 ENCOUNTER — CLINICAL SUPPORT (OUTPATIENT)
Dept: INTERNAL MEDICINE | Facility: CLINIC | Age: 51
End: 2021-08-03

## 2021-08-03 DIAGNOSIS — E03.9 HYPOTHYROIDISM, UNSPECIFIED TYPE: ICD-10-CM

## 2021-08-03 DIAGNOSIS — E78.2 MIXED HYPERLIPIDEMIA: ICD-10-CM

## 2021-08-03 LAB
BASOPHILS # BLD AUTO: 0.04 10*3/MM3 (ref 0–0.2)
BASOPHILS NFR BLD AUTO: 0.4 % (ref 0–1.5)
DEPRECATED RDW RBC AUTO: 42.6 FL (ref 37–54)
EOSINOPHIL # BLD AUTO: 0.04 10*3/MM3 (ref 0–0.4)
EOSINOPHIL NFR BLD AUTO: 0.4 % (ref 0.3–6.2)
ERYTHROCYTE [DISTWIDTH] IN BLOOD BY AUTOMATED COUNT: 12.8 % (ref 12.3–15.4)
HCT VFR BLD AUTO: 53.1 % (ref 37.5–51)
HGB BLD-MCNC: 17.3 G/DL (ref 13–17.7)
IMM GRANULOCYTES # BLD AUTO: 0.03 10*3/MM3 (ref 0–0.05)
IMM GRANULOCYTES NFR BLD AUTO: 0.3 % (ref 0–0.5)
LYMPHOCYTES # BLD AUTO: 2.14 10*3/MM3 (ref 0.7–3.1)
LYMPHOCYTES NFR BLD AUTO: 21.3 % (ref 19.6–45.3)
MCH RBC QN AUTO: 29.3 PG (ref 26.6–33)
MCHC RBC AUTO-ENTMCNC: 32.6 G/DL (ref 31.5–35.7)
MCV RBC AUTO: 90 FL (ref 79–97)
MONOCYTES # BLD AUTO: 0.6 10*3/MM3 (ref 0.1–0.9)
MONOCYTES NFR BLD AUTO: 6 % (ref 5–12)
NEUTROPHILS NFR BLD AUTO: 7.19 10*3/MM3 (ref 1.7–7)
NEUTROPHILS NFR BLD AUTO: 71.6 % (ref 42.7–76)
NRBC BLD AUTO-RTO: 0 /100 WBC (ref 0–0.2)
PLATELET # BLD AUTO: 354 10*3/MM3 (ref 140–450)
PMV BLD AUTO: 10 FL (ref 6–12)
RBC # BLD AUTO: 5.9 10*6/MM3 (ref 4.14–5.8)
WBC # BLD AUTO: 10.04 10*3/MM3 (ref 3.4–10.8)

## 2021-08-03 PROCEDURE — 80053 COMPREHEN METABOLIC PANEL: CPT | Performed by: PHYSICIAN ASSISTANT

## 2021-08-03 PROCEDURE — 84443 ASSAY THYROID STIM HORMONE: CPT | Performed by: PHYSICIAN ASSISTANT

## 2021-08-03 PROCEDURE — 80061 LIPID PANEL: CPT | Performed by: PHYSICIAN ASSISTANT

## 2021-08-03 PROCEDURE — 85025 COMPLETE CBC W/AUTO DIFF WBC: CPT | Performed by: PHYSICIAN ASSISTANT

## 2021-08-03 PROCEDURE — 84439 ASSAY OF FREE THYROXINE: CPT | Performed by: PHYSICIAN ASSISTANT

## 2021-08-03 PROCEDURE — 36415 COLL VENOUS BLD VENIPUNCTURE: CPT | Performed by: NURSE PRACTITIONER

## 2021-08-04 LAB
ALBUMIN SERPL-MCNC: 4.6 G/DL (ref 3.5–5.2)
ALBUMIN/GLOB SERPL: 1.8 G/DL
ALP SERPL-CCNC: 64 U/L (ref 39–117)
ALT SERPL W P-5'-P-CCNC: 27 U/L (ref 1–41)
ANION GAP SERPL CALCULATED.3IONS-SCNC: 8.3 MMOL/L (ref 5–15)
AST SERPL-CCNC: 19 U/L (ref 1–40)
BILIRUB SERPL-MCNC: 0.5 MG/DL (ref 0–1.2)
BUN SERPL-MCNC: 18 MG/DL (ref 6–20)
BUN/CREAT SERPL: 17.1 (ref 7–25)
CALCIUM SPEC-SCNC: 9.8 MG/DL (ref 8.6–10.5)
CHLORIDE SERPL-SCNC: 103 MMOL/L (ref 98–107)
CHOLEST SERPL-MCNC: 158 MG/DL (ref 0–200)
CO2 SERPL-SCNC: 28.7 MMOL/L (ref 22–29)
CREAT SERPL-MCNC: 1.05 MG/DL (ref 0.76–1.27)
GFR SERPL CREATININE-BSD FRML MDRD: 75 ML/MIN/1.73
GLOBULIN UR ELPH-MCNC: 2.6 GM/DL
GLUCOSE SERPL-MCNC: 89 MG/DL (ref 65–99)
HDLC SERPL-MCNC: 57 MG/DL (ref 40–60)
LDLC SERPL CALC-MCNC: 89 MG/DL (ref 0–100)
LDLC/HDLC SERPL: 1.57 {RATIO}
POTASSIUM SERPL-SCNC: 5 MMOL/L (ref 3.5–5.2)
PROT SERPL-MCNC: 7.2 G/DL (ref 6–8.5)
SODIUM SERPL-SCNC: 140 MMOL/L (ref 136–145)
T4 FREE SERPL-MCNC: 0.9 NG/DL (ref 0.93–1.7)
TRIGL SERPL-MCNC: 58 MG/DL (ref 0–150)
TSH SERPL DL<=0.05 MIU/L-ACNC: 3.52 UIU/ML (ref 0.27–4.2)
VLDLC SERPL-MCNC: 12 MG/DL (ref 5–40)

## 2021-09-02 ENCOUNTER — TREATMENT (OUTPATIENT)
Dept: PHYSICAL THERAPY | Facility: CLINIC | Age: 51
End: 2021-09-02

## 2021-09-02 DIAGNOSIS — M75.42 IMPINGEMENT SYNDROME OF LEFT SHOULDER: Primary | ICD-10-CM

## 2021-09-02 DIAGNOSIS — G89.29 CHRONIC LEFT SHOULDER PAIN: ICD-10-CM

## 2021-09-02 DIAGNOSIS — M75.112 PARTIAL NONTRAUMATIC TEAR OF LEFT ROTATOR CUFF: ICD-10-CM

## 2021-09-02 DIAGNOSIS — M25.512 CHRONIC LEFT SHOULDER PAIN: ICD-10-CM

## 2021-09-02 PROCEDURE — 97161 PT EVAL LOW COMPLEX 20 MIN: CPT | Performed by: PHYSICAL THERAPIST

## 2021-09-02 PROCEDURE — 97110 THERAPEUTIC EXERCISES: CPT | Performed by: PHYSICAL THERAPIST

## 2021-09-02 NOTE — PROGRESS NOTES
Physical Therapy Initial Evaluation and Plan of Care        Patient: Sivakumar Gray   : 1970  Diagnosis/ICD-10 Code:  Impingement syndrome of left shoulder [M75.42]  Referring practitioner: Fabricio Luna MD  Date of Initial Visit: 2021  Today's Date: 2021  Patient seen for 1 sessions           Subjective Questionnaire: UEFS: 60/80      Subjective Evaluation    History of Present Illness  Mechanism of injury: Patient reports that he started noticing L shoulder in March.  Patient reports he was doing 200 push ups multiple times a week. Patient reports that he has backed off of heavy lifting and push ups but he continued to have worsening L shoulder pain.  [employer] is left handed.  Patient reports that overhead reaching, lifting, lying on his left side, reaching behind his back increases his pain.  Patient was given an injection which has improved pain.  Patient is taking no pain medication.  Patient is working from his home and reports that his desk set up is not ideal for proper posture. Pt educated on need to improved desk set up in order to decrease pain.     Pain  Current pain ratin  At best pain ratin  At worst pain ratin  Quality: dull ache, discomfort and pulling  Relieving factors: rest and change in position  Aggravating factors: keyboarding, overhead activity, lifting, prolonged positioning, repetitive movement, sleeping and movement  Progression: improved    Social Support  Lives with: spouse    Diagnostic Tests  X-ray: abnormal  MRI studies: abnormal    Treatments  Previous treatment: physical therapy  Patient Goals  Patient goals for therapy: decreased pain, improved balance, increased strength, increased motion, independence with ADLs/IADLs and return to sport/leisure activities             Objective          Static Posture     Head  Forward.    Shoulders  Rounded.    Scapulae  Left protracted and right protracted.    Thoracic Spine  Hyperkyphosis.    Lumbar Spine    Decreased lordosis.     Hip   Hip (Left): Externally rotated.   Hip (Right): Externally rotated.     Tenderness     Left Shoulder   Tenderness in the AC joint, acromion, infraspinatus tendon and supraspinatus tendon. No tenderness in the biceps tendon (proximal) and bicipital groove.     Additional Tenderness Details  L pec minor tightness and tenderness noted    Active Range of Motion   Left Shoulder   Flexion: 144 degrees with pain  Abduction: 105 degrees with pain  External rotation 0°: 39 degrees   Internal rotation BTB: T10 with pain    Right Shoulder   Flexion: 150 degrees   Abduction: 140 degrees   External rotation 0°: 39 degrees   Internal rotation BTB: T8     Passive Range of Motion   Left Shoulder   Flexion: 165 degrees with pain  Abduction: 169 degrees with pain  External rotation 90°: 81 degrees with pain  Internal rotation 90°: 67 degrees with pain    Strength/Myotome Testing     Left Shoulder     Planes of Motion   Flexion: 4   Extension: 4   Abduction: 4-   External rotation at 0°: 4   Internal rotation at 0°: 4     Right Shoulder     Planes of Motion   Flexion: 4+   Extension: 4+   Abduction: 4+   External rotation at 0°: 4+   Internal rotation at 0°: 4+     Left Elbow   Flexion: 5  Extension: 5    Right Elbow   Flexion: 5  Extension: 5    Additional Strength Details  Bilateral scapular stabilizer MMT: 4-/5    Tests     Left Shoulder   Positive AC shear, drop arm, Hawkin's, Neer's and painful arc.   Negative empty can and full can.           Assessment & Plan     Assessment  Impairments: abnormal muscle firing, abnormal muscle tone, abnormal or restricted ROM, activity intolerance, impaired physical strength, lacks appropriate home exercise program, pain with function and safety issue  Assessment details: Patient presents with signs/symptoms consistent with left shoulder impingement syndrome including decreased shoulder ROM, bilateral scapular and left shoulder weakness and reports of pain  limiting function during ADLs as shown on the UEFS. Patient would benefit from skilled PT services in order to address remaining deficits limiting function at this time.    Prognosis: good  Functional Limitations: carrying objects, lifting, sleeping, pulling, pushing, uncomfortable because of pain, reaching behind back, reaching overhead and unable to perform repetitive tasks  Goals  Plan Goals: 1. The patient has limited ROM of the left shoulder.    LTG 1: 12 weeks:  The patient will demonstrate 160 degrees of left shoulder flexion, 150 degrees of shoulder abduction, 50 degrees of shoulder external rotation, and shoulder internal rotation to T8 to allow the patient to reach into upper kitchen cabinets and manipulate clothing behind the back with greater ease.    STATUS:  New    TREATMENT: Manual therapy, therapeutic exercise, home exercise instruction, and modalities as needed to include: electrical stimulation, ultrasound, moist heat, and ice.    2. The patient has limited strength of the left shoulder.   LTG 2: 12 weeks:  The patient will demonstrate 5 /5 strength for left shoulder flexion, abduction, external rotation, and internal rotation in order to demonstrate improved shoulder stability.    STATUS:  New   STG 2a: 6 weeks:  The patient will demonstrate 4+ /5 strength for left shoulder flexion, abduction, external rotation, and internal rotation.    STATUS:  New   STG2b:  4 weeks:  The patient will be independent with home exercises.     STATUS:  New   TREATMENT: Manual therapy, therapeutic exercise, home exercise instruction, and modalities as needed to include: electrical stimulation, ultrasound, moist heat, and ice.     3. The patient complains of pain to the left shoulder.   LTG 3: 12 weeks:  The patient will report a pain rating of 2 /10 or better in order to improve sleep quality and tolerance to performance of activities of daily living.    STATUS:  New   STG 3a: 6 weeks:  The patient will report a  pain rating of 3 /10 or better.      STATUS:  New   TREATMENT: Manual therapy, therapeutic exercise, home exercise instruction, and modalities as needed to include: electrical stimulation, ultrasound, moist heat, and ice.    4. Carrying, Moving, and Handling Objects Functional Limitation     LTG 4: 12 weeks:  The patient will demonstrate 1-19 % limitation by achieving a score of 75 on the UEFS.    STATUS:  New   TREATMENT:  Manual therapy, therapeutic exercise, home exercise instruction, and modalities as needed to include: moist heat, electrical stimulation, and ultrasound.      Plan  Therapy options: will be seen for skilled physical therapy services  Planned modality interventions: cryotherapy, electrical stimulation/Russian stimulation and TENS  Planned therapy interventions: balance/weight-bearing training, ADL retraining, soft tissue mobilization, strengthening, stretching, therapeutic activities, joint mobilization, home exercise program, functional ROM exercises, flexibility, body mechanics training, postural training, neuromuscular re-education, manual therapy, motor coordination training and spinal/joint mobilization  Frequency: 2x week  Duration in weeks: 12  Treatment plan discussed with: patient        Timed:         Manual Therapy:    0     mins  23065;     Therapeutic Exercise:    10     mins  29289;     Neuromuscular Abdoul:    0    mins  25619;    Therapeutic Activity:     0     mins  62712;     Gait Trainin     mins  41670;     Ultrasound:     0     mins  45742;    Ionto                               0    mins   21448  Self-care  __0__ mins 21679    Un-Timed:  Electrical Stimulation:    0     mins  76652 ( );  Traction     0     mins 32818  Low Eval     30     Mins  37926  Mod Eval     0     Mins  99377  High Eval                       0     Mins  88821  Hot pack     0     Mins    Cold pack                       0     Mins      Timed Treatment:   10   mins   Total Treatment:     40    mins    PT SIGNATURE: Najma Ocampo, MANUEL   DATE TREATMENT INITIATED: 9/2/2021    Initial Certification  Certification Period: 12/1/2021  I certify that the therapy services are furnished while this patient is under my care.  The services outlined above are required by this patient, and will be reviewed every 90 days.     PHYSICIAN: Fabricio uLna MD      DATE:     Please sign and return via fax to [phone].. Thank you, [facility].

## 2021-09-07 ENCOUNTER — TREATMENT (OUTPATIENT)
Dept: PHYSICAL THERAPY | Facility: CLINIC | Age: 51
End: 2021-09-07

## 2021-09-07 DIAGNOSIS — G89.29 CHRONIC LEFT SHOULDER PAIN: ICD-10-CM

## 2021-09-07 DIAGNOSIS — M75.42 IMPINGEMENT SYNDROME OF LEFT SHOULDER: Primary | ICD-10-CM

## 2021-09-07 DIAGNOSIS — M75.112 PARTIAL NONTRAUMATIC TEAR OF LEFT ROTATOR CUFF: ICD-10-CM

## 2021-09-07 DIAGNOSIS — M25.512 CHRONIC LEFT SHOULDER PAIN: ICD-10-CM

## 2021-09-07 PROCEDURE — 97110 THERAPEUTIC EXERCISES: CPT | Performed by: PHYSICAL THERAPIST

## 2021-09-07 PROCEDURE — 97140 MANUAL THERAPY 1/> REGIONS: CPT | Performed by: PHYSICAL THERAPIST

## 2021-09-07 NOTE — PROGRESS NOTES
Physical Therapy Daily Progress Note        Patient: Sivakumar Gray   : 1970  Diagnosis/ICD-10 Code:  Impingement syndrome of left shoulder [M75.42]  Referring practitioner: Fabricio Luna MD  Date of Initial Visit: Type: THERAPY  Noted: 2021  Today's Date: 2021  Patient seen for 2 sessions             Subjective   Sivakumar Gray reports: no L shoulder pain today. Patient reports HEP is going well.    Objective   Bilateral scapular stabilizer MMT: 4-/5  See Exercise, Manual, and Modality Logs for complete treatment.       Assessment/Plan  Patient tolerated all exercise progressions and manual therapy well today but continues to demonstrate strength/ROM deficits limiting function. Continue to progress per patient tolerance.    Progress per Plan of Care           Timed:  Manual Therapy:    10     mins  95878;  Therapeutic Exercise:    28     mins  75117;     Neuromuscular Abdoul:    0    mins  30371;    Therapeutic Activity:     0     mins  82385;     Gait Trainin     mins  31708;     Ultrasound:     0     mins  95932;    Electrical Stimulation:    0     mins  16253;  Iontophoresis     0     mins  02340    Untimed:  Electrical Stimulation:    0     mins  04025 ( );  Mechanical Traction:    0     mins  98645;   Fluidotherapy     0     mins  17502  Hot pack     0     Mins    Cold pack                       0     Mins     Timed Treatment:   38   mins   Total Treatment:     38   mins        Najma Ocampo PT  Physical Therapist

## 2021-09-24 ENCOUNTER — TREATMENT (OUTPATIENT)
Dept: PHYSICAL THERAPY | Facility: CLINIC | Age: 51
End: 2021-09-24

## 2021-09-24 DIAGNOSIS — G89.29 CHRONIC LEFT SHOULDER PAIN: ICD-10-CM

## 2021-09-24 DIAGNOSIS — M25.512 CHRONIC LEFT SHOULDER PAIN: ICD-10-CM

## 2021-09-24 DIAGNOSIS — M75.112 PARTIAL NONTRAUMATIC TEAR OF LEFT ROTATOR CUFF: ICD-10-CM

## 2021-09-24 DIAGNOSIS — M75.42 IMPINGEMENT SYNDROME OF LEFT SHOULDER: Primary | ICD-10-CM

## 2021-09-24 PROCEDURE — 97110 THERAPEUTIC EXERCISES: CPT | Performed by: PHYSICAL THERAPIST

## 2021-09-24 NOTE — PROGRESS NOTES
Physical Therapy Daily Progress Note        Patient: Sivakumar Gray   : 1970  Diagnosis/ICD-10 Code:  Impingement syndrome of left shoulder [M75.42]  Referring practitioner: Fabricio Luna MD  Date of Initial Visit: Type: THERAPY  Noted: 2021  Today's Date: 2021  Patient seen for 3 sessions             Subjective   Sivakumar Gray reports: no pain at beginning of session today.    Objective   Bilateral scapular stabilizer MMT: 4-/5  See Exercise, Manual, and Modality Logs for complete treatment.       Assessment/Plan  Patient tolerated all exercise progressions and manual therapy well today but continues to demonstrate strength/ROM deficits limiting function. Continue to progress per patient tolerance.    Progress per Plan of Care           Timed:  Manual Therapy:    5     mins  19923;  Therapeutic Exercise:    25     mins  74653;     Neuromuscular Abdoul:    0    mins  88795;    Therapeutic Activity:     0     mins  54236;     Gait Trainin     mins  41634;     Ultrasound:     0     mins  04178;    Electrical Stimulation:    0     mins  29102;  Iontophoresis     0     mins  29693    Untimed:  Electrical Stimulation:    0     mins  87076 ( );  Mechanical Traction:    0     mins  59440;   Fluidotherapy     0     mins  67323     Timed Treatment:   30   mins   Total Treatment:     30   mins        Najma Ocampo PT  Physical Therapist

## 2021-10-01 ENCOUNTER — TREATMENT (OUTPATIENT)
Dept: PHYSICAL THERAPY | Facility: CLINIC | Age: 51
End: 2021-10-01

## 2021-10-01 DIAGNOSIS — M25.512 CHRONIC LEFT SHOULDER PAIN: ICD-10-CM

## 2021-10-01 DIAGNOSIS — M75.42 IMPINGEMENT SYNDROME OF LEFT SHOULDER: Primary | ICD-10-CM

## 2021-10-01 DIAGNOSIS — M75.112 PARTIAL NONTRAUMATIC TEAR OF LEFT ROTATOR CUFF: ICD-10-CM

## 2021-10-01 DIAGNOSIS — G89.29 CHRONIC LEFT SHOULDER PAIN: ICD-10-CM

## 2021-10-01 PROCEDURE — 97110 THERAPEUTIC EXERCISES: CPT | Performed by: PHYSICAL THERAPIST

## 2021-10-01 NOTE — PROGRESS NOTES
Physical Therapy Daily Progress Note        Patient: Sivakumar Gray   : 1970  Diagnosis/ICD-10 Code:  Impingement syndrome of left shoulder [M75.42]  Referring practitioner: Fabricio Luna MD  Date of Initial Visit: Type: THERAPY  Noted: 2021  Today's Date: 10/1/2021  Patient seen for 4 sessions             Subjective   Sivakumar Gray reports: no pain at beginning of session today.    Objective   Bilateral scapular stabilizer MMT: 4/5  See Exercise, Manual, and Modality Logs for complete treatment.       Assessment/Plan  Patient tolerated all exercise progressions and manual therapy well today but continues to demonstrate strength/ROM deficits limiting function. Continue to progress per patient tolerance.    Progress per Plan of Care           Timed:  Manual Therapy:    6     mins  91827;  Therapeutic Exercise:    26     mins  48125;     Neuromuscular Abdoul:    0    mins  43366;    Therapeutic Activity:     0     mins  89256;     Gait Trainin     mins  05912;     Ultrasound:     0     mins  09503;    Electrical Stimulation:    0     mins  66001;  Iontophoresis     0     mins  23175    Untimed:  Electrical Stimulation:    0     mins  01102 ( );  Mechanical Traction:    0     mins  52367;   Fluidotherapy     0     mins  30472  Hot pack     0     Mins    Cold pack                       0     Mins     Timed Treatment:   32   mins   Total Treatment:     32   mins        Najma Ocampo PT  Physical Therapist

## 2021-10-18 ENCOUNTER — TELEPHONE (OUTPATIENT)
Dept: INTERNAL MEDICINE | Facility: CLINIC | Age: 51
End: 2021-10-18

## 2021-10-18 NOTE — TELEPHONE ENCOUNTER
Caller: Sivakumar Gray    Relationship: Self    Best call back number: [phone]    What orders are you requesting (i.e. lab or imaging): LAB ORDERS     In what timeframe would the patient need to come in: HE WOULD LIKE TO COME IN TOMORROW AND HAVE THIS DONE.     Where will you receive your lab/imaging services: IN THE OFFICE     Additional notes: PATIENT HAS AN APPOINTMENT SCHEDULED FOR 11/4 BUT WOULD LIKE TO HAVE BLOOD WORK DONE TOMORROW FOR HIS TYROID.  PLEASE CALL HIM AND LET HIM KNOW WHEN ORDERS ARE READY FOR HIM.

## 2021-10-20 ENCOUNTER — CLINICAL SUPPORT (OUTPATIENT)
Dept: INTERNAL MEDICINE | Facility: CLINIC | Age: 51
End: 2021-10-20

## 2021-10-20 DIAGNOSIS — E03.9 HYPOTHYROIDISM, UNSPECIFIED TYPE: Primary | ICD-10-CM

## 2021-10-20 LAB
T-UPTAKE NFR SERPL: 1.05 TBI (ref 0.8–1.3)
T4 SERPL-MCNC: 5.72 MCG/DL (ref 4.5–11.7)
TSH SERPL DL<=0.05 MIU/L-ACNC: 4.04 UIU/ML (ref 0.27–4.2)

## 2021-10-20 PROCEDURE — 84436 ASSAY OF TOTAL THYROXINE: CPT | Performed by: NURSE PRACTITIONER

## 2021-10-20 PROCEDURE — 84479 ASSAY OF THYROID (T3 OR T4): CPT | Performed by: NURSE PRACTITIONER

## 2021-10-20 PROCEDURE — 84443 ASSAY THYROID STIM HORMONE: CPT | Performed by: NURSE PRACTITIONER

## 2021-10-20 PROCEDURE — 36415 COLL VENOUS BLD VENIPUNCTURE: CPT | Performed by: NURSE PRACTITIONER

## 2021-11-04 ENCOUNTER — OFFICE VISIT (OUTPATIENT)
Dept: INTERNAL MEDICINE | Facility: CLINIC | Age: 51
End: 2021-11-04

## 2021-11-04 VITALS
SYSTOLIC BLOOD PRESSURE: 132 MMHG | OXYGEN SATURATION: 98 % | DIASTOLIC BLOOD PRESSURE: 82 MMHG | TEMPERATURE: 97.4 F | BODY MASS INDEX: 31.74 KG/M2 | HEIGHT: 67 IN | WEIGHT: 202.25 LBS | HEART RATE: 78 BPM

## 2021-11-04 DIAGNOSIS — Z28.21 COVID-19 VACCINATION DECLINED: ICD-10-CM

## 2021-11-04 DIAGNOSIS — R06.00 DYSPNEA, UNSPECIFIED TYPE: Primary | ICD-10-CM

## 2021-11-04 PROCEDURE — 99214 OFFICE O/P EST MOD 30 MIN: CPT | Performed by: NURSE PRACTITIONER

## 2021-11-04 NOTE — PROGRESS NOTES
"Chief Complaint  Hypothyroidism and Exemption (wanting exemption from COVID vaccine)    Subjective          Sivakumar Gray presents to [facility]  History of Present Illness  The patient comes in today requesting an exemption form completion for the Covid vaccine.  He states that that he has several questions and concerns regarding the vaccine.  He states that he has had ongoing shortness of breath for 7 years.  He reports worsening in the last 3 years.  He denies chest pain.  He has not previously had pulmonary function testing.  Objective   Vital Signs:   /82   Pulse 78   Temp 97.4 °F (36.3 °C) (Temporal)   Ht 168.9 cm (66.5\")   Wt 91.7 kg (202 lb 4 oz)   SpO2 98%   BMI 32.15 kg/m²     Physical Exam  Vitals and nursing note reviewed.   Constitutional:       General: He is not in acute distress.     Appearance: Normal appearance.   HENT:      Head: Normocephalic and atraumatic.      Right Ear: External ear normal.      Left Ear: External ear normal.      Nose: Nose normal.      Mouth/Throat:      Mouth: Mucous membranes are moist.   Eyes:      Conjunctiva/sclera: Conjunctivae normal.   Cardiovascular:      Rate and Rhythm: Normal rate and regular rhythm.      Pulses: Normal pulses.      Heart sounds: Normal heart sounds. No murmur heard.  No friction rub. No gallop.    Pulmonary:      Effort: Pulmonary effort is normal. No respiratory distress.      Breath sounds: No wheezing, rhonchi or rales.   Musculoskeletal:      Cervical back: Neck supple.      Right lower leg: No edema.      Left lower leg: No edema.   Skin:     General: Skin is warm and dry.   Neurological:      General: No focal deficit present.      Mental Status: He is alert and oriented to person, place, and time.   Psychiatric:         Mood and Affect: Mood normal.         Behavior: Behavior normal.        Result Review :          Procedures      Assessment and Plan    Diagnoses and all orders " for this visit:    1. Dyspnea, unspecified type (Primary)  Assessment & Plan:  We will send for pulmonary function testing    Orders:  -     Full Pulmonary Function Test With Bronchodilator; Future    2. COVID-19 vaccination declined  Assessment & Plan:  Discussed recommendations at length regarding Covid vaccine.  At this point I see no contraindication for the patient to get the Pfizer Covid vaccine series.  Patient requesting that form be completed for his employer for exemption.  Unable to complete at this time.  Patient reports that he will continue to do research and make a decision regarding vaccine.            I spent 30 minutes caring for Sivakumar on this date of service. This time includes time spent by me in the following activities:reviewing tests, performing a medically appropriate examination and/or evaluation , counseling and educating the patient/family/caregiver, ordering medications, tests, or procedures and documenting information in the medical record  Follow Up   Return in about 6 weeks (around 12/16/2021).  Patient was given instructions and counseling regarding his condition or for health maintenance advice. Please see specific information pulled into the AVS if appropriate.

## 2021-11-04 NOTE — ASSESSMENT & PLAN NOTE
Discussed recommendations at length regarding Covid vaccine.  At this point I see no contraindication for the patient to get the Pfizer Covid vaccine series.  Patient requesting that form be completed for his employer for exemption.  Unable to complete at this time.  Patient reports that he will continue to do research and make a decision regarding vaccine.

## 2021-11-26 DIAGNOSIS — E03.9 HYPOTHYROIDISM, UNSPECIFIED TYPE: ICD-10-CM

## 2021-11-29 RX ORDER — THYROID 30 MG/1
30 TABLET ORAL DAILY
Qty: 90 TABLET | Refills: 1 | Status: SHIPPED | OUTPATIENT
Start: 2021-11-29 | End: 2021-12-23 | Stop reason: SDUPTHER

## 2021-12-23 ENCOUNTER — TELEPHONE (OUTPATIENT)
Dept: INTERNAL MEDICINE | Facility: CLINIC | Age: 51
End: 2021-12-23

## 2021-12-23 DIAGNOSIS — E03.9 HYPOTHYROIDISM, UNSPECIFIED TYPE: ICD-10-CM

## 2021-12-23 RX ORDER — THYROID 30 MG/1
30 TABLET ORAL DAILY
Qty: 90 TABLET | Refills: 1 | Status: SHIPPED | OUTPATIENT
Start: 2021-12-23 | End: 2022-03-08

## 2021-12-23 NOTE — TELEPHONE ENCOUNTER
Caller: Sivakumar Gray    Relationship: Self    Best call back number: [phone] CAN LEAVE A MESSAGE ON VOICEMAIL    Requested Prescriptions:   Requested Prescriptions     Pending Prescriptions Disp Refills   • [place] Thyroid 30 MG tablet 90 tablet 1     Sig: Take 1 tablet by mouth Daily.        Pharmacy where request should be sent: [id number] - [place], [place] - [address] [facility] AT [facility] OF [address] & [place] - [phone] [facility] [phone] FX     Additional details provided by patient:  PATIENT HAS ABOUT A 5 DAY SUPPLY  Does the patient have less than a 3 day supply:  [] Yes  [x] No    Sudhakar Aggarwal Rep   12/23/21 13:56 EST

## 2021-12-28 ENCOUNTER — HOSPITAL ENCOUNTER (OUTPATIENT)
Dept: RESPIRATORY THERAPY | Facility: HOSPITAL | Age: 51
Discharge: HOME | End: 2021-12-28
Admitting: NURSE PRACTITIONER

## 2021-12-28 DIAGNOSIS — R06.00 DYSPNEA, UNSPECIFIED TYPE: ICD-10-CM

## 2021-12-28 DIAGNOSIS — E03.9 HYPOTHYROIDISM, UNSPECIFIED TYPE: ICD-10-CM

## 2021-12-28 PROCEDURE — 94726 PLETHYSMOGRAPHY LUNG VOLUMES: CPT

## 2021-12-28 PROCEDURE — 94729 DIFFUSING CAPACITY: CPT

## 2021-12-28 PROCEDURE — 94060 EVALUATION OF WHEEZING: CPT

## 2021-12-28 RX ORDER — THYROID 30 MG/1
30 TABLET ORAL DAILY
Qty: 30 TABLET | OUTPATIENT
Start: 2021-12-28

## 2021-12-28 RX ORDER — ALBUTEROL SULFATE 2.5 MG/3ML
2.5 SOLUTION RESPIRATORY (INHALATION) ONCE
Status: COMPLETED | OUTPATIENT
Start: 2021-12-28 | End: 2021-12-28

## 2021-12-28 RX ADMIN — ALBUTEROL SULFATE 2.5 MG: 2.5 SOLUTION RESPIRATORY (INHALATION) at 16:30

## 2021-12-29 DIAGNOSIS — E03.9 HYPOTHYROIDISM, UNSPECIFIED TYPE: ICD-10-CM

## 2021-12-29 RX ORDER — THYROID 30 MG/1
30 TABLET ORAL DAILY
Qty: 90 TABLET | Refills: 1 | OUTPATIENT
Start: 2021-12-29

## 2022-01-24 ENCOUNTER — TELEPHONE (OUTPATIENT)
Dept: INTERNAL MEDICINE | Facility: CLINIC | Age: 52
End: 2022-01-24

## 2022-01-24 NOTE — TELEPHONE ENCOUNTER
Pt. Concerned about pulmonary function test he didn't know it was the same as the lung test he took. Printed and mailed results

## 2022-01-24 NOTE — TELEPHONE ENCOUNTER
[facility] WAS UNABLE TO WARM TRANSFER     Caller: Sivakumar Gray    Relationship: Self    Best call back number: [phone]     What is the best time to reach you: ANYTIME     Who are you requesting to speak with (clinical staff, provider,  specific staff member): CLINICAL     Do you know the name of the person who called: JUJU     What was the call regarding: PATIENT RECEIVED A MESSAGE ON SPOUSE VOICEMAIL, ABOUT GETTING PULMONARY TESTING.     Do you require a callback: YES

## 2022-01-31 DIAGNOSIS — E03.9 HYPOTHYROIDISM, UNSPECIFIED TYPE: ICD-10-CM

## 2022-01-31 RX ORDER — THYROID 30 MG/1
30 TABLET ORAL DAILY
Qty: 90 TABLET | Refills: 1 | OUTPATIENT
Start: 2022-01-31

## 2022-02-01 ENCOUNTER — CLINICAL SUPPORT (OUTPATIENT)
Dept: INTERNAL MEDICINE | Facility: CLINIC | Age: 52
End: 2022-02-01

## 2022-02-01 DIAGNOSIS — E03.9 HYPOTHYROIDISM, UNSPECIFIED TYPE: Primary | ICD-10-CM

## 2022-02-01 LAB
T-UPTAKE NFR SERPL: 1.1 TBI (ref 0.8–1.3)
T4 SERPL-MCNC: 6.12 MCG/DL (ref 4.5–11.7)
TSH SERPL DL<=0.05 MIU/L-ACNC: 4.46 UIU/ML (ref 0.27–4.2)

## 2022-02-01 PROCEDURE — 36415 COLL VENOUS BLD VENIPUNCTURE: CPT | Performed by: NURSE PRACTITIONER

## 2022-02-01 PROCEDURE — 84479 ASSAY OF THYROID (T3 OR T4): CPT | Performed by: NURSE PRACTITIONER

## 2022-02-01 PROCEDURE — 84443 ASSAY THYROID STIM HORMONE: CPT | Performed by: NURSE PRACTITIONER

## 2022-02-01 PROCEDURE — 84436 ASSAY OF TOTAL THYROXINE: CPT | Performed by: NURSE PRACTITIONER

## 2022-02-07 DIAGNOSIS — E03.9 HYPOTHYROIDISM, UNSPECIFIED TYPE: Primary | ICD-10-CM

## 2022-03-08 ENCOUNTER — TELEPHONE (OUTPATIENT)
Dept: INTERNAL MEDICINE | Facility: CLINIC | Age: 52
End: 2022-03-08

## 2022-03-08 DIAGNOSIS — E03.9 HYPOTHYROIDISM, UNSPECIFIED TYPE: ICD-10-CM

## 2022-03-08 RX ORDER — LEVOTHYROXINE AND LIOTHYRONINE 38; 9 UG/1; UG/1
60 TABLET ORAL DAILY
Qty: 90 TABLET | Refills: 0 | Status: SHIPPED | OUTPATIENT
Start: 2022-03-08 | End: 2022-06-13

## 2022-03-08 NOTE — TELEPHONE ENCOUNTER
Talked to pt, verbalized understanding.   Implemented All Universal Safety Interventions:  [place] to call system. Call bell, personal items and telephone within reach. Instruct patient to call for assistance. Room bathroom lighting operational. Non-slip footwear when patient is off stretcher. Physically safe environment: no spills, clutter or unnecessary equipment. Stretcher in lowest position, wheels locked, appropriate side rails in place.

## 2022-03-08 NOTE — TELEPHONE ENCOUNTER
Red rule verified and correct.    Needing a refill of the [place] thyroid.  Pt stated this med was to be increased.    He is looking for a new RX     Luz Minor, APRN   2/7/2022  9:58 AM EST         Thyroid is slightly abnormal. We could consider increasing thyroid medication dosage at this time or repeat labs in 6 wks as thyroid will fluctuate some. If he would like to increase dosage, let me know. With increase we will also need to repeat labs in 6 wks so lab orders have been placed.

## 2022-03-08 NOTE — TELEPHONE ENCOUNTER
Increased dose of [place] Thyroid 60 mg has been sent to his pharmacy.  Lab orders were previously placed but I would like for him to wait until he has been on higher dose for 6 to 8 weeks prior to getting these drawn.  Please let patient know.

## 2022-04-27 ENCOUNTER — DOCUMENTATION (OUTPATIENT)
Dept: PHYSICAL THERAPY | Facility: CLINIC | Age: 52
End: 2022-04-27

## 2022-05-31 ENCOUNTER — CLINICAL SUPPORT (OUTPATIENT)
Dept: INTERNAL MEDICINE | Facility: CLINIC | Age: 52
End: 2022-05-31

## 2022-05-31 DIAGNOSIS — E03.9 HYPOTHYROIDISM, UNSPECIFIED TYPE: ICD-10-CM

## 2022-05-31 LAB
T4 FREE SERPL-MCNC: 0.83 NG/DL (ref 0.93–1.7)
TSH SERPL DL<=0.05 MIU/L-ACNC: 2.27 UIU/ML (ref 0.27–4.2)

## 2022-05-31 PROCEDURE — 84443 ASSAY THYROID STIM HORMONE: CPT | Performed by: NURSE PRACTITIONER

## 2022-05-31 PROCEDURE — 36415 COLL VENOUS BLD VENIPUNCTURE: CPT | Performed by: NURSE PRACTITIONER

## 2022-05-31 PROCEDURE — 84439 ASSAY OF FREE THYROXINE: CPT | Performed by: NURSE PRACTITIONER

## 2022-06-07 ENCOUNTER — OFFICE VISIT (OUTPATIENT)
Dept: INTERNAL MEDICINE | Facility: CLINIC | Age: 52
End: 2022-06-07

## 2022-06-07 VITALS
RESPIRATION RATE: 18 BRPM | HEART RATE: 76 BPM | OXYGEN SATURATION: 97 % | TEMPERATURE: 96.7 F | SYSTOLIC BLOOD PRESSURE: 120 MMHG | DIASTOLIC BLOOD PRESSURE: 74 MMHG | BODY MASS INDEX: 33.21 KG/M2 | HEIGHT: 67 IN | WEIGHT: 211.6 LBS

## 2022-06-07 DIAGNOSIS — E03.9 HYPOTHYROIDISM, UNSPECIFIED TYPE: ICD-10-CM

## 2022-06-07 DIAGNOSIS — M54.2 NECK PAIN: ICD-10-CM

## 2022-06-07 DIAGNOSIS — Z00.00 ANNUAL PHYSICAL EXAM: Primary | ICD-10-CM

## 2022-06-07 DIAGNOSIS — Z12.5 SCREENING FOR PROSTATE CANCER: ICD-10-CM

## 2022-06-07 DIAGNOSIS — Z12.11 ENCOUNTER FOR SCREENING FOR MALIGNANT NEOPLASM OF COLON: ICD-10-CM

## 2022-06-07 DIAGNOSIS — R21 RASH AND NONSPECIFIC SKIN ERUPTION: ICD-10-CM

## 2022-06-07 PROCEDURE — 99396 PREV VISIT EST AGE 40-64: CPT | Performed by: NURSE PRACTITIONER

## 2022-06-07 RX ORDER — TIZANIDINE 2 MG/1
2 TABLET ORAL NIGHTLY PRN
Qty: 90 TABLET | Refills: 1 | Status: SHIPPED | OUTPATIENT
Start: 2022-06-07 | End: 2022-12-15

## 2022-06-07 RX ORDER — CLOTRIMAZOLE AND BETAMETHASONE DIPROPIONATE 10; .64 MG/G; MG/G
1 CREAM TOPICAL 2 TIMES DAILY
Qty: 45 G | Refills: 1 | Status: SHIPPED | OUTPATIENT
Start: 2022-06-07 | End: 2022-12-15

## 2022-06-07 NOTE — PROGRESS NOTES
"Chief Complaint  Annual physical, hypothyroid    Subjective          Sivakumar Gray presents to [facility]  History of Present Illness  Rash on left ankle x1 year  Flares up when he wears boots riding bike  Does not feel like area of rash has spread in the last year  Using a topical emollient routinely    Thyroid labs reviewed   right sided neck pain in the last week. Believes he may have slept wrong or hurt this while lifting  Objective   Vital Signs:   /74 (BP Location: Right arm, Patient Position: Sitting, Cuff Size: Adult)   Pulse 76   Temp 96.7 °F (35.9 °C)   Resp 18   Ht 168.9 cm (66.5\")   Wt 96 kg (211 lb 9.6 oz)   SpO2 97%   BMI 33.64 kg/m²     Physical Exam  Vitals and nursing note reviewed.   Constitutional:       General: He is not in acute distress.     Appearance: Normal appearance.   HENT:      Head: Normocephalic and atraumatic.      Right Ear: External ear normal.      Left Ear: External ear normal.      Nose: Nose normal.      Mouth/Throat:      Mouth: Mucous membranes are moist.   Eyes:      Conjunctiva/sclera: Conjunctivae normal.   Cardiovascular:      Rate and Rhythm: Normal rate and regular rhythm.      Pulses: Normal pulses.      Heart sounds: Normal heart sounds. No murmur heard.    No friction rub. No gallop.   Pulmonary:      Effort: Pulmonary effort is normal. No respiratory distress.      Breath sounds: No wheezing, rhonchi or rales.   Musculoskeletal:      Cervical back: Neck supple.      Right lower leg: No edema.      Left lower leg: No edema.   Skin:     General: Skin is warm and dry.   Neurological:      General: No focal deficit present.      Mental Status: He is alert and oriented to person, place, and time.   Psychiatric:         Mood and Affect: Mood normal.         Behavior: Behavior normal.        Result Review :     TSH    TSH 10/20/21 2/1/22 5/31/22   TSH 4.040 4.460 (A) 2.270   (A) Abnormal value             "   Procedures      Assessment and Plan    Diagnoses and all orders for this visit:    1. Annual physical exam (Primary)  -     Comprehensive Metabolic Panel; Future  -     CBC & Differential; Future  -     TSH; Future  -     Lipid Panel; Future    2. Hypothyroidism, unspecified type  Comments:  labs reviewed, cont meds    3. Encounter for screening for malignant neoplasm of colon  -     Ambulatory Referral For Screening Colonoscopy; Future    4. Screening for prostate cancer  -     PSA Screen; Future    5. Rash and nonspecific skin eruption  Comments:  left ankle--fungal vs contact dermatitis. will try steroid/antifungal. if not improving in 3-4 wks, he will call for derm referral      6. Neck pain  Comments:  discussed using a muscle relaxer prn at night. he will call if not improving  Orders:  -     tiZANidine (ZANAFLEX) 2 MG tablet; Take 1 tablet by mouth At Night As Needed for Muscle Spasms.  Dispense: 90 tablet; Refill: 1    Other orders  -     clotrimazole-betamethasone (LOTRISONE) 1-0.05 % cream; Apply 1 application topically to the appropriate area as directed 2 (Two) Times a Day.  Dispense: 45 g; Refill: 1        40 to 64: Counseling/Anticipatory Guidance Discussed: nutrition, physical activity, healthy weight, injury prevention, immunizations and screenings      Follow Up   No follow-ups on file.  Patient was given instructions and counseling regarding his condition or for health maintenance advice. Please see specific information pulled into the AVS if appropriate.

## 2022-06-13 RX ORDER — THYROID 60 MG
TABLET ORAL
Qty: 90 TABLET | Refills: 1 | Status: ON HOLD | OUTPATIENT
Start: 2022-06-13 | End: 2022-12-19

## 2022-09-29 ENCOUNTER — PREP FOR SURGERY (OUTPATIENT)
Dept: OTHER | Facility: HOSPITAL | Age: 52
End: 2022-09-29

## 2022-09-29 ENCOUNTER — CLINICAL SUPPORT (OUTPATIENT)
Dept: GASTROENTEROLOGY | Facility: CLINIC | Age: 52
End: 2022-09-29

## 2022-09-29 DIAGNOSIS — Z12.11 COLON CANCER SCREENING: Primary | ICD-10-CM

## 2022-09-29 DIAGNOSIS — Z12.11 ENCOUNTER FOR SCREENING FOR MALIGNANT NEOPLASM OF COLON: ICD-10-CM

## 2022-09-29 RX ORDER — PEG-3350, SODIUM SULFATE, SODIUM CHLORIDE, POTASSIUM CHLORIDE, SODIUM ASCORBATE AND ASCORBIC ACID 7.5-2.691G
1000 KIT ORAL EVERY 12 HOURS
Qty: 1000 ML | Refills: 0 | Status: SHIPPED | OUTPATIENT
Start: 2022-09-29 | End: 2022-12-12 | Stop reason: SDUPTHER

## 2022-09-29 NOTE — PROGRESS NOTES
Sivakumar Gray  REASON FOR CALL: SCREENING CALL FIRST COLON   SENT IN PREP: MOVIPREP  DOS: 12.19.2022    Past Medical History:   Diagnosis Date   • Acute lateral meniscal tear, left, initial encounter 03/09/2018   • Condition not found 05/26/2018    aftercare following left knee arthroscopic revision ACL reconstruction with allograft and partial medial meniscectomy and chondroplasty 4/3/2018   • Left ACL tear 03/09/2018   • Left knee pain 03/09/2018    unspecified chronicity   • Limb swelling    • Medial meniscus tear 03/09/2018   • SOB (shortness of breath)    • Thyroid disorder      Allergies   Allergen Reactions   • Amoxicillin Hives     Past Surgical History:   Procedure Laterality Date   • KNEE ACL RECONSTRUCTION Left     TWO KNEE RECONSTRUCTIONS   • WISDOM TOOTH EXTRACTION       Social History     Socioeconomic History   • Marital status:    Tobacco Use   • Smoking status: Never Smoker   • Smokeless tobacco: Never Used   Vaping Use   • Vaping Use: Never used   Substance and Sexual Activity   • Alcohol use: Yes     Comment: rarely drinks, less than 1 drink per day, has been drinking for 21-30 years   • Drug use: Never   • Sexual activity: Defer     Family History   Problem Relation Age of Onset   • Arthritis Sister    • Colon cancer Neg Hx        Current Outpatient Medications:   •  [place] Thyroid 60 MG tablet, TAKE 1 TABLET BY MOUTH DAILY, Disp: 90 tablet, Rfl: 1  •  acetaminophen (TYLENOL) 500 MG tablet, Take 1 tablet by mouth Every 6 (Six) Hours As Needed for Mild Pain ., Disp: 30 tablet, Rfl: 0  •  azithromycin (ZITHROMAX) 500 MG tablet, Take 1 tablet by mouth Daily., Disp: 5 tablet, Rfl: 0  •  Benzocaine-Menthol (Cepacol) 15-2.3 MG lozenge, Dissolve 1 lozenge in the mouth 4 (Four) Times a Day., Disp: 16 lozenge, Rfl: 0  •  benzonatate (TESSALON) 200 MG capsule, Take 1 capsule by mouth 3 (Three) Times a Day As Needed for Cough., Disp: 40 capsule, Rfl: 0  •  clotrimazole-betamethasone (LOTRISONE)  1-0.05 % cream, Apply 1 application topically to the appropriate area as directed 2 (Two) Times a Day., Disp: 45 g, Rfl: 1  •  dexamethasone (DECADRON) 6 MG tablet, Take 1 tablet by mouth 2 (Two) Times a Day With Meals., Disp: 10 tablet, Rfl: 0  •  naproxen (Naprosyn) 500 MG tablet, Take 1 tablet by mouth 2 (Two) Times a Day With Meals., Disp: 60 tablet, Rfl: 1  •  tiZANidine (ZANAFLEX) 2 MG tablet, Take 1 tablet by mouth At Night As Needed for Muscle Spasms., Disp: 90 tablet, Rfl: 1

## 2022-12-05 ENCOUNTER — TELEPHONE (OUTPATIENT)
Dept: GASTROENTEROLOGY | Facility: CLINIC | Age: 52
End: 2022-12-05

## 2022-12-05 NOTE — TELEPHONE ENCOUNTER
I left  reminding Mr Gray of his scheduled scope on 12.19.22, arrival time of 10:00am. Reminded of liquid diet the day prior. Reminded of bowel prep and instructions. Stated I would also send Reality Jockeyt message. adele

## 2022-12-12 ENCOUNTER — TELEPHONE (OUTPATIENT)
Dept: GASTROENTEROLOGY | Facility: CLINIC | Age: 52
End: 2022-12-12

## 2022-12-12 DIAGNOSIS — Z12.11 ENCOUNTER FOR SCREENING FOR MALIGNANT NEOPLASM OF COLON: Primary | ICD-10-CM

## 2022-12-12 RX ORDER — PEG-3350, SODIUM SULFATE, SODIUM CHLORIDE, POTASSIUM CHLORIDE, SODIUM ASCORBATE AND ASCORBIC ACID 7.5-2.691G
1000 KIT ORAL EVERY 12 HOURS
Qty: 1000 ML | Refills: 0 | Status: SHIPPED | OUTPATIENT
Start: 2022-12-12 | End: 2022-12-15

## 2022-12-19 ENCOUNTER — HOSPITAL ENCOUNTER (OUTPATIENT)
Facility: HOSPITAL | Age: 52
Setting detail: HOSPITAL OUTPATIENT SURGERY
Discharge: HOME | End: 2022-12-19
Attending: INTERNAL MEDICINE | Admitting: INTERNAL MEDICINE

## 2022-12-19 ENCOUNTER — ANESTHESIA EVENT (OUTPATIENT)
Dept: GASTROENTEROLOGY | Facility: HOSPITAL | Age: 52
End: 2022-12-19

## 2022-12-19 ENCOUNTER — ANESTHESIA (OUTPATIENT)
Dept: GASTROENTEROLOGY | Facility: HOSPITAL | Age: 52
End: 2022-12-19

## 2022-12-19 VITALS
RESPIRATION RATE: 12 BRPM | HEART RATE: 75 BPM | SYSTOLIC BLOOD PRESSURE: 100 MMHG | TEMPERATURE: 97 F | OXYGEN SATURATION: 93 % | DIASTOLIC BLOOD PRESSURE: 76 MMHG | WEIGHT: 203.71 LBS | BODY MASS INDEX: 31.97 KG/M2 | HEIGHT: 67 IN

## 2022-12-19 DIAGNOSIS — Z12.11 COLON CANCER SCREENING: ICD-10-CM

## 2022-12-19 PROCEDURE — 25010000002 PROPOFOL 10 MG/ML EMULSION: Performed by: NURSE ANESTHETIST, CERTIFIED REGISTERED

## 2022-12-19 PROCEDURE — 45385 COLONOSCOPY W/LESION REMOVAL: CPT | Performed by: INTERNAL MEDICINE

## 2022-12-19 PROCEDURE — 88305 TISSUE EXAM BY PATHOLOGIST: CPT | Performed by: INTERNAL MEDICINE

## 2022-12-19 RX ORDER — SODIUM CHLORIDE, SODIUM LACTATE, POTASSIUM CHLORIDE, CALCIUM CHLORIDE 600; 310; 30; 20 MG/100ML; MG/100ML; MG/100ML; MG/100ML
30 INJECTION, SOLUTION INTRAVENOUS CONTINUOUS
Status: DISCONTINUED | OUTPATIENT
Start: 2022-12-19 | End: 2022-12-19 | Stop reason: HOSPADM

## 2022-12-19 RX ORDER — THYROID 60 MG
TABLET ORAL
Qty: 90 TABLET | Refills: 1 | Status: SHIPPED | OUTPATIENT
Start: 2022-12-19

## 2022-12-19 RX ORDER — PROPOFOL 10 MG/ML
VIAL (ML) INTRAVENOUS AS NEEDED
Status: DISCONTINUED | OUTPATIENT
Start: 2022-12-19 | End: 2022-12-19

## 2022-12-19 RX ORDER — LIDOCAINE HYDROCHLORIDE 20 MG/ML
INJECTION, SOLUTION EPIDURAL; INFILTRATION; INTRACAUDAL; PERINEURAL AS NEEDED
Status: DISCONTINUED | OUTPATIENT
Start: 2022-12-19 | End: 2022-12-19 | Stop reason: SURG

## 2022-12-19 RX ADMIN — SODIUM CHLORIDE, POTASSIUM CHLORIDE, SODIUM LACTATE AND CALCIUM CHLORIDE 30 ML/HR: 600; 310; 30; 20 INJECTION, SOLUTION INTRAVENOUS at 10:27

## 2022-12-19 RX ADMIN — PROPOFOL 250 MCG/KG/MIN: 10 INJECTION, EMULSION INTRAVENOUS at 11:25

## 2022-12-19 RX ADMIN — LIDOCAINE HYDROCHLORIDE 40 MG: 20 INJECTION, SOLUTION EPIDURAL; INFILTRATION; INTRACAUDAL; PERINEURAL at 11:25

## 2022-12-19 NOTE — ANESTHESIA POSTPROCEDURE EVALUATION
Patient: Sivakumar Gray    Procedure Summary     Date: 12/19/22 Room / Location: [facility] ENDOSCOPY 4 / [facility] ENDOSCOPY    Anesthesia Start: 1125 Anesthesia Stop: 1152    Procedure: COLONOSCOPY WITH POLYPECTOMIES HOT SNARE Diagnosis:       Colon cancer screening      (Colon cancer screening [Z12.11])    Surgeons: Donny Agustin MD Provider: Didier Dang MD    Anesthesia Type: general ASA Status: 2          Anesthesia Type: general    Vitals  Vitals Value Taken Time   BP 99/63 12/19/22 1156   Temp 36.2 °C (97.1 °F) 12/19/22 1156   Pulse 86 12/19/22 1201   Resp 20 12/19/22 1156   SpO2 93 % 12/19/22 1201   Vitals shown include unvalidated device data.        Post Anesthesia Care and Evaluation    Patient location during evaluation: bedside  Patient participation: complete - patient participated  Level of consciousness: awake  Pain management: adequate    Airway patency: patent  Anesthetic complications: No anesthetic complications  PONV Status: none  Cardiovascular status: acceptable and stable  Respiratory status: acceptable  Hydration status: acceptable    Comments: An Anesthesiologist personally participated in the most demanding procedures (including induction and emergence if applicable) in the anesthesia plan, monitored the course of anesthesia administration at frequent intervals and remained physically present and available for immediate diagnosis and treatment of emergencies.

## 2022-12-19 NOTE — H&P
"ScreeningPre Procedure History & Physical    Chief Complaint:   Screening     Subjective     HPI:   Screening     Past Medical History:   Past Medical History:   Diagnosis Date   • Acute lateral meniscal tear, left, initial encounter 03/09/2018   • Condition not found 05/26/2018    aftercare following left knee arthroscopic revision ACL reconstruction with allograft and partial medial meniscectomy and chondroplasty 4/3/2018   • Left ACL tear 03/09/2018   • Left knee pain 03/09/2018    unspecified chronicity   • Limb swelling    • Medial meniscus tear 03/09/2018   • SOB (shortness of breath)    • Thyroid disorder        Past Surgical History:  Past Surgical History:   Procedure Laterality Date   • KNEE ACL RECONSTRUCTION Left     TWO KNEE RECONSTRUCTIONS 2011 & 2016   • WISDOM TOOTH EXTRACTION         Family History:  Family History   Problem Relation Age of Onset   • Arthritis Sister    • Colon cancer Neg Hx    • Malig Hyperthermia Neg Hx        Social History:   reports that he has never smoked. He has never used smokeless tobacco. He reports current alcohol use of about 1.0 standard drink per week. He reports that he does not use drugs.    Medications:   Medications Prior to Admission   Medication Sig Dispense Refill Last Dose   • acetaminophen (TYLENOL) 500 MG tablet Take 1 tablet by mouth Every 6 (Six) Hours As Needed for Mild Pain . 30 tablet 0    • [place] Thyroid 60 MG tablet TAKE 1 TABLET BY MOUTH DAILY 90 tablet 1        Allergies:  Amoxicillin        Objective     Blood pressure 123/74, pulse 64, temperature 97.6 °F (36.4 °C), temperature source Temporal, resp. rate 16, height 168.9 cm (66.5\"), weight 92.4 kg (203 lb 11.3 oz), SpO2 97 %.    Physical Exam   Constitutional: Pt is oriented to person, place, and time and well-developed, well-nourished, and in no distress.   Mouth/Throat: Oropharynx is clear and moist.   Neck: Normal range of motion.   Cardiovascular: Normal rate, regular rhythm and normal heart " sounds.    Pulmonary/Chest: Effort normal and breath sounds normal.   Abdominal: Soft. Nontender  Skin: Skin is warm and dry.   Psychiatric: Mood, memory, affect and judgment normal.     Assessment & Plan     Diagnosis:  Screening colonoscopy  H/o colon polyps     Anticipated Surgical Procedure:  colonoscopy    The risks, benefits, and alternatives of this procedure have been discussed with the patient or the responsible party- the patient understands and agrees to proceed.

## 2022-12-19 NOTE — ANESTHESIA PREPROCEDURE EVALUATION
Anesthesia Evaluation     Patient summary reviewed and Nursing notes reviewed   no history of anesthetic complications:  NPO Solid Status: > 8 hours  NPO Liquid Status: > 2 hours           Airway   Mallampati: II  TM distance: >3 FB  Neck ROM: full  No difficulty expected  Dental      Pulmonary - normal exam    breath sounds clear to auscultation  (+) shortness of breath,   Cardiovascular - normal exam  Exercise tolerance: good (4-7 METS)    Rhythm: regular  Rate: normal    (+) hyperlipidemia,       Neuro/Psych- negative ROS  GI/Hepatic/Renal/Endo    (+)   thyroid problem hypothyroidism    Musculoskeletal (-) negative ROS    Abdominal    Substance History - negative use     OB/GYN negative ob/gyn ROS         Other - negative ROS       ROS/Med Hx Other: PAT Nursing Notes unavailable.                   Anesthesia Plan    ASA 2     general     (Total IV Anesthesia    Patient understands anesthesia not responsible for dental damage.  )  intravenous induction     Anesthetic plan, risks, benefits, and alternatives have been provided, discussed and informed consent has been obtained with: patient.  Pre-procedure education provided  Plan discussed with CRNA.        CODE STATUS:

## 2022-12-20 ENCOUNTER — TELEPHONE (OUTPATIENT)
Dept: GASTROENTEROLOGY | Facility: CLINIC | Age: 52
End: 2022-12-20

## 2022-12-20 LAB
CYTO UR: NORMAL
LAB AP CASE REPORT: NORMAL
LAB AP CLINICAL INFORMATION: NORMAL
PATH REPORT.FINAL DX SPEC: NORMAL
PATH REPORT.GROSS SPEC: NORMAL

## 2022-12-20 NOTE — TELEPHONE ENCOUNTER
Patient placed in 5 year colon cancer screening recall ----- Message from JOO Ruiz sent at 12/20/2022  3:49 PM EST -----  I have reviewed the patient colonoscopy and pathology report. Patient has tubular adenoma polyp(s) on pathology report that are smaller than 10mm in size. It is recommended the patient be on a 5 year recall. Please place in the recall system.

## 2023-08-07 ENCOUNTER — OFFICE VISIT (OUTPATIENT)
Dept: INTERNAL MEDICINE | Facility: CLINIC | Age: 53
End: 2023-08-07
Payer: OTHER GOVERNMENT

## 2023-08-07 VITALS
DIASTOLIC BLOOD PRESSURE: 74 MMHG | BODY MASS INDEX: 32.69 KG/M2 | HEART RATE: 75 BPM | WEIGHT: 208.25 LBS | OXYGEN SATURATION: 96 % | RESPIRATION RATE: 18 BRPM | HEIGHT: 67 IN | TEMPERATURE: 97.8 F | SYSTOLIC BLOOD PRESSURE: 132 MMHG

## 2023-08-07 DIAGNOSIS — Z12.5 SCREENING PSA (PROSTATE SPECIFIC ANTIGEN): ICD-10-CM

## 2023-08-07 DIAGNOSIS — Z01.89 ENCOUNTER FOR LABORATORY TEST: ICD-10-CM

## 2023-08-07 DIAGNOSIS — Z00.00 ANNUAL PHYSICAL EXAM: ICD-10-CM

## 2023-08-07 DIAGNOSIS — R21 RASH AND NONSPECIFIC SKIN ERUPTION: Primary | ICD-10-CM

## 2023-08-07 LAB
ALBUMIN SERPL-MCNC: 4.4 G/DL (ref 3.5–5.2)
ALBUMIN/GLOB SERPL: 1.8 G/DL
ALP SERPL-CCNC: 81 U/L (ref 39–117)
ALT SERPL W P-5'-P-CCNC: 33 U/L (ref 1–41)
ANION GAP SERPL CALCULATED.3IONS-SCNC: 15.9 MMOL/L (ref 5–15)
AST SERPL-CCNC: 22 U/L (ref 1–40)
BASOPHILS # BLD AUTO: 0.06 10*3/MM3 (ref 0–0.2)
BASOPHILS NFR BLD AUTO: 0.6 % (ref 0–1.5)
BILIRUB SERPL-MCNC: 0.3 MG/DL (ref 0–1.2)
BUN SERPL-MCNC: 16 MG/DL (ref 6–20)
BUN/CREAT SERPL: 14.3 (ref 7–25)
CALCIUM SPEC-SCNC: 9.6 MG/DL (ref 8.6–10.5)
CHLORIDE SERPL-SCNC: 103 MMOL/L (ref 98–107)
CHOLEST SERPL-MCNC: 184 MG/DL (ref 0–200)
CO2 SERPL-SCNC: 23.1 MMOL/L (ref 22–29)
CREAT SERPL-MCNC: 1.12 MG/DL (ref 0.76–1.27)
DEPRECATED RDW RBC AUTO: 43.3 FL (ref 37–54)
EGFRCR SERPLBLD CKD-EPI 2021: 79.5 ML/MIN/1.73
EOSINOPHIL # BLD AUTO: 0.11 10*3/MM3 (ref 0–0.4)
EOSINOPHIL NFR BLD AUTO: 1.1 % (ref 0.3–6.2)
ERYTHROCYTE [DISTWIDTH] IN BLOOD BY AUTOMATED COUNT: 13 % (ref 12.3–15.4)
FOLATE SERPL-MCNC: 7.91 NG/ML (ref 4.78–24.2)
GLOBULIN UR ELPH-MCNC: 2.4 GM/DL
GLUCOSE SERPL-MCNC: 76 MG/DL (ref 65–99)
HCT VFR BLD AUTO: 51.4 % (ref 37.5–51)
HDLC SERPL-MCNC: 35 MG/DL (ref 40–60)
HGB BLD-MCNC: 17.3 G/DL (ref 13–17.7)
IMM GRANULOCYTES # BLD AUTO: 0.02 10*3/MM3 (ref 0–0.05)
IMM GRANULOCYTES NFR BLD AUTO: 0.2 % (ref 0–0.5)
IRON 24H UR-MRATE: 65 MCG/DL (ref 59–158)
IRON SATN MFR SERPL: 18 % (ref 20–50)
LDLC SERPL CALC-MCNC: 100 MG/DL (ref 0–100)
LDLC/HDLC SERPL: 2.62 {RATIO}
LYMPHOCYTES # BLD AUTO: 2.92 10*3/MM3 (ref 0.7–3.1)
LYMPHOCYTES NFR BLD AUTO: 29.6 % (ref 19.6–45.3)
MCH RBC QN AUTO: 30.4 PG (ref 26.6–33)
MCHC RBC AUTO-ENTMCNC: 33.7 G/DL (ref 31.5–35.7)
MCV RBC AUTO: 90.2 FL (ref 79–97)
MONOCYTES # BLD AUTO: 0.89 10*3/MM3 (ref 0.1–0.9)
MONOCYTES NFR BLD AUTO: 9 % (ref 5–12)
NEUTROPHILS NFR BLD AUTO: 5.85 10*3/MM3 (ref 1.7–7)
NEUTROPHILS NFR BLD AUTO: 59.5 % (ref 42.7–76)
NRBC BLD AUTO-RTO: 0 /100 WBC (ref 0–0.2)
PLATELET # BLD AUTO: 328 10*3/MM3 (ref 140–450)
PMV BLD AUTO: 10 FL (ref 6–12)
POTASSIUM SERPL-SCNC: 4 MMOL/L (ref 3.5–5.2)
PROT SERPL-MCNC: 6.8 G/DL (ref 6–8.5)
PSA SERPL-MCNC: 0.38 NG/ML (ref 0–4)
RBC # BLD AUTO: 5.7 10*6/MM3 (ref 4.14–5.8)
SODIUM SERPL-SCNC: 142 MMOL/L (ref 136–145)
TIBC SERPL-MCNC: 361 MCG/DL (ref 298–536)
TRANSFERRIN SERPL-MCNC: 242 MG/DL (ref 200–360)
TRIGL SERPL-MCNC: 287 MG/DL (ref 0–150)
TSH SERPL DL<=0.05 MIU/L-ACNC: 3.52 UIU/ML (ref 0.27–4.2)
VIT B12 BLD-MCNC: 888 PG/ML (ref 211–946)
VLDLC SERPL-MCNC: 49 MG/DL (ref 5–40)
WBC NRBC COR # BLD: 9.85 10*3/MM3 (ref 3.4–10.8)

## 2023-08-07 PROCEDURE — 85025 COMPLETE CBC W/AUTO DIFF WBC: CPT | Performed by: NURSE PRACTITIONER

## 2023-08-07 PROCEDURE — 36415 COLL VENOUS BLD VENIPUNCTURE: CPT | Performed by: PHYSICIAN ASSISTANT

## 2023-08-07 PROCEDURE — 99213 OFFICE O/P EST LOW 20 MIN: CPT | Performed by: PHYSICIAN ASSISTANT

## 2023-08-07 PROCEDURE — 82607 VITAMIN B-12: CPT | Performed by: PHYSICIAN ASSISTANT

## 2023-08-07 PROCEDURE — 83540 ASSAY OF IRON: CPT | Performed by: PHYSICIAN ASSISTANT

## 2023-08-07 PROCEDURE — 84443 ASSAY THYROID STIM HORMONE: CPT | Performed by: PHYSICIAN ASSISTANT

## 2023-08-07 PROCEDURE — 82746 ASSAY OF FOLIC ACID SERUM: CPT | Performed by: PHYSICIAN ASSISTANT

## 2023-08-07 PROCEDURE — 84466 ASSAY OF TRANSFERRIN: CPT | Performed by: PHYSICIAN ASSISTANT

## 2023-08-07 PROCEDURE — 80061 LIPID PANEL: CPT | Performed by: PHYSICIAN ASSISTANT

## 2023-08-07 PROCEDURE — G0103 PSA SCREENING: HCPCS | Performed by: PHYSICIAN ASSISTANT

## 2023-08-07 PROCEDURE — 80053 COMPREHEN METABOLIC PANEL: CPT | Performed by: PHYSICIAN ASSISTANT

## 2023-08-07 NOTE — PROGRESS NOTES
"Chief Complaint  Abdominal Pain (Painful sensitivity of skin on lower abdomin ) and eyes itching  (Itching painful spots in the eyes and on eye lids )    Subjective          Sivakumar Gray presents to [facility]    Rash- symptoms started intially a couple months ago.  He was given triamcinolone cream helped clear the rash but he is still having sensitivity even with clothes or blankets touching his abdomen.  He stopped using the triamcinolone cream a couple weeks ago but symptoms are still persistent.  They do not seem to be worsening but not improving either.  Patient states that he has a history of an unknown rash that he obtained while serving in [employer] several years ago.  No known diagnosis but the rash eventually went away after a couple years.  Patient states this current symptom is different than what he has had before.  Patient notes a 15 pound weight gain within the last several months due to more sedentary lifestyle due to COVID.  No other symptoms at this time.        Objective   Vital Signs:   /74 (BP Location: Left arm, Patient Position: Sitting, Cuff Size: Adult)   Pulse 75   Temp 97.8 øF (36.6 øC) (Temporal)   Resp 18   Ht 168.9 cm (66.5\")   Wt 94.5 kg (208 lb 4 oz)   SpO2 96%   BMI 33.11 kg/mý     Physical Exam  Vitals reviewed.   Constitutional:       Appearance: Normal appearance. He is well-developed.   HENT:      Head: Normocephalic and atraumatic.   Eyes:      Conjunctiva/sclera: Conjunctivae normal.      Pupils: Pupils are equal, round, and reactive to light.   Cardiovascular:      Rate and Rhythm: Normal rate and regular rhythm.      Heart sounds: No murmur heard.    No friction rub. No gallop.   Pulmonary:      Effort: Pulmonary effort is normal.      Breath sounds: Normal breath sounds. No wheezing or rhonchi.   Abdominal:      Palpations: Abdomen is soft. There is no mass.      Tenderness: There is no abdominal tenderness. " There is no guarding.   Skin:     General: Skin is warm and dry.      Findings: No erythema or rash.   Neurological:      Mental Status: He is alert and oriented to person, place, and time.      Cranial Nerves: No cranial nerve deficit.   Psychiatric:         Mood and Affect: Mood and affect normal.         Behavior: Behavior normal.         Thought Content: Thought content normal.         Judgment: Judgment normal.      Result Review :          Procedures      Assessment and Plan    Diagnoses and all orders for this visit:    1. Rash and nonspecific skin eruption (Primary)  Assessment & Plan:  No rash currently but patient experiencing sensitivity.  Will check labs that could be contributing to neuropathic, superficial pain.  Patient to follow up with PCP within the next 2-4 weeks to re-evaluate.  Call for any questions or concerns.     Orders:  -     Vitamin B12  -     Folate  -     Iron and TIBC    2. Encounter for laboratory test  -     Comprehensive Metabolic Panel  -     CBC & Differential  -     TSH  -     Lipid Panel  -     PSA Screen    3. Annual physical exam  Comments:  labs ordered today to have prior to annual phys appt  Orders:  -     Comprehensive Metabolic Panel  -     CBC & Differential  -     TSH  -     Lipid Panel  -     PSA Screen    4. Screening PSA (prostate specific antigen)  -     PSA Screen              Follow Up   No follow-ups on file.  Patient was given instructions and counseling regarding his condition or for health maintenance advice. Please see specific information pulled into the AVS if appropriate.

## 2023-08-07 NOTE — ASSESSMENT & PLAN NOTE
No rash currently but patient experiencing sensitivity.  Will check labs that could be contributing to neuropathic, superficial pain.  Patient to follow up with PCP within the next 2-4 weeks to re-evaluate.  Call for any questions or concerns.

## 2023-09-18 RX ORDER — THYROID 60 MG
TABLET ORAL
Qty: 90 TABLET | Refills: 1 | Status: SHIPPED | OUTPATIENT
Start: 2023-09-18

## 2024-01-31 ENCOUNTER — OFFICE VISIT (OUTPATIENT)
Dept: UROLOGY | Facility: CLINIC | Age: 54
End: 2024-01-31
Payer: OTHER GOVERNMENT

## 2024-01-31 VITALS — BODY MASS INDEX: 33.71 KG/M2 | DIASTOLIC BLOOD PRESSURE: 80 MMHG | WEIGHT: 212 LBS | SYSTOLIC BLOOD PRESSURE: 127 MMHG

## 2024-01-31 DIAGNOSIS — N40.1 BENIGN PROSTATIC HYPERPLASIA (BPH) WITH POST-VOID DRIBBLING: Primary | ICD-10-CM

## 2024-01-31 DIAGNOSIS — N39.43 BENIGN PROSTATIC HYPERPLASIA (BPH) WITH POST-VOID DRIBBLING: Primary | ICD-10-CM

## 2024-01-31 LAB
BILIRUB BLD-MCNC: NEGATIVE MG/DL
CLARITY, POC: CLEAR
COLOR UR: YELLOW
EXPIRATION DATE: NORMAL
GLUCOSE UR STRIP-MCNC: NEGATIVE MG/DL
KETONES UR QL: NEGATIVE
LEUKOCYTE EST, POC: NEGATIVE
Lab: NORMAL
NITRITE UR-MCNC: NEGATIVE MG/ML
PH UR: 6 [PH] (ref 5–8)
PROT UR STRIP-MCNC: NEGATIVE MG/DL
RBC # UR STRIP: NEGATIVE /UL
SP GR UR: 1.01 (ref 1–1.03)
UROBILINOGEN UR QL: NORMAL

## 2024-01-31 RX ORDER — TAMSULOSIN HYDROCHLORIDE 0.4 MG/1
1 CAPSULE ORAL DAILY
Qty: 90 CAPSULE | Refills: 3 | Status: SHIPPED | OUTPATIENT
Start: 2024-01-31 | End: 2025-01-25

## 2024-01-31 NOTE — PROGRESS NOTES
Chief Complaint  Urinary Incontinence    Subjective          Sivakumar Gray presents to [facility] UROLOGY    History of Present Illness  Patient is here for complaints of postvoid dribbling.  He states that started a couple of years ago.  He notices stream is maybe a little bit slower than it has been but his main complaint is of the postvoid dribbling.  He will urinate and then as he is walking away from the toilet will have dribbling of urine.  He reports that if he pushes on the area underneath his scrotum right after he urinates a little more urine will come out and then he does not dribble.  The symptoms are interfering with his quality of life.      Objective   Vital Signs:   /80   Wt 96.2 kg (212 lb)   BMI 33.71 kg/m²       Physical Exam  Vitals and nursing note reviewed.   Constitutional:       Appearance: Normal appearance. He is well-developed.   Pulmonary:      Effort: Pulmonary effort is normal.      Breath sounds: Normal air entry.   Neurological:      Mental Status: He is alert and oriented to person, place, and time.      Motor: Motor function is intact.   Psychiatric:         Mood and Affect: Mood normal.         Behavior: Behavior normal.          Result Review :   The following data was reviewed by: Comfort Beach MD on 01/31/2024:    Results for orders placed or performed in visit on 01/31/24   POC Urinalysis Dipstick, Automated    Specimen: Urine   Result Value Ref Range    Color Yellow Yellow, Straw, Dark Yellow, Mary    Clarity, UA Clear Clear    Specific Gravity  1.015 1.005 - 1.030    pH, Urine 6.0 5.0 - 8.0    Leukocytes Negative Negative    Nitrite, UA Negative Negative    Protein, POC Negative Negative mg/dL    Glucose, UA Negative Negative mg/dL    Ketones, UA Negative Negative    Urobilinogen, UA 0.2 E.U./dL Normal, 0.2 E.U./dL    Bilirubin Negative Negative    Blood, UA Negative Negative    Lot Number [id number]     Expiration Date 12/32,024          PSA           8/7/2023    16:40   PSA   PSA 0.381               Assessment and Plan    Diagnoses and all orders for this visit:    1. Benign prostatic hyperplasia (BPH) with post-void dribbling (Primary)  -     POC Urinalysis Dipstick, Automated  -     tamsulosin (FLOMAX) 0.4 MG capsule 24 hr capsule; Take 1 capsule by mouth Daily for 360 days.  Dispense: 90 capsule; Refill: 3    Started patient on Flomax.  Will see him back in 2 months to see whether this is effective.        Follow Up       No follow-ups on file.  Patient was given instructions and counseling regarding his condition or for health maintenance advice. Please see specific information pulled into the AVS if appropriate.

## 2024-04-01 RX ORDER — THYROID 60 MG
TABLET ORAL
Qty: 90 TABLET | Refills: 1 | Status: SHIPPED | OUTPATIENT
Start: 2024-04-01

## 2024-05-08 ENCOUNTER — OFFICE VISIT (OUTPATIENT)
Dept: UROLOGY | Facility: CLINIC | Age: 54
End: 2024-05-08
Payer: OTHER GOVERNMENT

## 2024-05-08 VITALS
SYSTOLIC BLOOD PRESSURE: 118 MMHG | HEIGHT: 66 IN | DIASTOLIC BLOOD PRESSURE: 88 MMHG | WEIGHT: 212 LBS | HEART RATE: 83 BPM | BODY MASS INDEX: 34.07 KG/M2

## 2024-05-08 DIAGNOSIS — N40.1 BENIGN PROSTATIC HYPERPLASIA (BPH) WITH POST-VOID DRIBBLING: Primary | ICD-10-CM

## 2024-05-08 DIAGNOSIS — N39.43 BENIGN PROSTATIC HYPERPLASIA (BPH) WITH POST-VOID DRIBBLING: Primary | ICD-10-CM

## 2024-05-08 LAB
BILIRUB BLD-MCNC: NEGATIVE MG/DL
CLARITY, POC: CLEAR
COLOR UR: YELLOW
EXPIRATION DATE: NORMAL
GLUCOSE UR STRIP-MCNC: NEGATIVE MG/DL
KETONES UR QL: NEGATIVE
LEUKOCYTE EST, POC: NEGATIVE
Lab: NORMAL
NITRITE UR-MCNC: NEGATIVE MG/ML
PH UR: 5.5 [PH] (ref 5–8)
PROT UR STRIP-MCNC: NEGATIVE MG/DL
RBC # UR STRIP: NEGATIVE /UL
SP GR UR: 1.02 (ref 1–1.03)
URINE VOLUME: 0
UROBILINOGEN UR QL: NORMAL

## 2024-09-10 RX ORDER — THYROID 60 MG
TABLET ORAL
Qty: 30 TABLET | Refills: 0 | Status: SHIPPED | OUTPATIENT
Start: 2024-09-10

## 2024-09-17 ENCOUNTER — OFFICE VISIT (OUTPATIENT)
Dept: INTERNAL MEDICINE | Facility: CLINIC | Age: 54
End: 2024-09-17
Payer: OTHER GOVERNMENT

## 2024-09-17 VITALS
HEART RATE: 78 BPM | RESPIRATION RATE: 20 BRPM | BODY MASS INDEX: 34.23 KG/M2 | SYSTOLIC BLOOD PRESSURE: 118 MMHG | DIASTOLIC BLOOD PRESSURE: 76 MMHG | HEIGHT: 66 IN | OXYGEN SATURATION: 95 % | TEMPERATURE: 97.5 F | WEIGHT: 213 LBS

## 2024-09-17 DIAGNOSIS — Z00.00 ANNUAL PHYSICAL EXAM: Primary | ICD-10-CM

## 2024-09-17 DIAGNOSIS — Z12.5 SCREENING PSA (PROSTATE SPECIFIC ANTIGEN): ICD-10-CM

## 2024-09-17 DIAGNOSIS — G89.29 NECK PAIN, CHRONIC: ICD-10-CM

## 2024-09-17 DIAGNOSIS — Z11.59 NEED FOR HEPATITIS C SCREENING TEST: ICD-10-CM

## 2024-09-17 DIAGNOSIS — E03.9 HYPOTHYROIDISM, UNSPECIFIED TYPE: ICD-10-CM

## 2024-09-17 DIAGNOSIS — M54.2 NECK PAIN, CHRONIC: ICD-10-CM

## 2024-09-17 PROCEDURE — 99396 PREV VISIT EST AGE 40-64: CPT | Performed by: NURSE PRACTITIONER

## 2024-09-18 ENCOUNTER — TELEPHONE (OUTPATIENT)
Dept: INTERNAL MEDICINE | Facility: CLINIC | Age: 54
End: 2024-09-18
Payer: OTHER GOVERNMENT

## 2024-10-21 ENCOUNTER — TELEPHONE (OUTPATIENT)
Dept: INTERNAL MEDICINE | Facility: CLINIC | Age: 54
End: 2024-10-21
Payer: OTHER GOVERNMENT

## 2024-10-21 NOTE — TELEPHONE ENCOUNTER
Hub staff attempted to follow warm transfer process and was unsuccessful     Caller: Sivakumar Gray    Relationship to patient: Self    Best call back number: [phone]     Patient is needing: PATIENT CALLED TO RESCHEDULE HIS MISSED LAB APPOINTMENT FROM 9.20.24      PATIENT STATES THAT HE WOULD LIKE TO COME IN TUESDAY OR FRIDAY MORNING    PATIENT WOULD LIKE A CALLBACK

## 2024-10-21 NOTE — TELEPHONE ENCOUNTER
STILL UNABLE TO WARM TRANSFER    PATIENT CALLED AGAIN TRYING TO GET LABS SCHEDULED AND IS WAITING TO GET A CALL BACK WITH THE APPOINTMENT TIME

## 2024-10-22 ENCOUNTER — PATIENT MESSAGE (OUTPATIENT)
Dept: INTERNAL MEDICINE | Facility: CLINIC | Age: 54
End: 2024-10-22

## 2024-10-22 ENCOUNTER — CLINICAL SUPPORT (OUTPATIENT)
Dept: INTERNAL MEDICINE | Facility: CLINIC | Age: 54
End: 2024-10-22
Payer: OTHER GOVERNMENT

## 2024-10-22 DIAGNOSIS — Z01.89 ENCOUNTER FOR LABORATORY TEST: Primary | ICD-10-CM

## 2024-10-22 DIAGNOSIS — E03.9 HYPOTHYROIDISM, UNSPECIFIED TYPE: ICD-10-CM

## 2024-10-22 DIAGNOSIS — E03.9 HYPOTHYROIDISM, UNSPECIFIED TYPE: Primary | ICD-10-CM

## 2024-10-22 DIAGNOSIS — Z12.5 SCREENING PSA (PROSTATE SPECIFIC ANTIGEN): ICD-10-CM

## 2024-10-22 LAB
ALBUMIN SERPL-MCNC: 4.3 G/DL (ref 3.5–5.2)
ALBUMIN/GLOB SERPL: 1.5 G/DL
ALP SERPL-CCNC: 82 U/L (ref 39–117)
ALT SERPL W P-5'-P-CCNC: 37 U/L (ref 1–41)
ANION GAP SERPL CALCULATED.3IONS-SCNC: 8.5 MMOL/L (ref 5–15)
AST SERPL-CCNC: 19 U/L (ref 1–40)
BASOPHILS # BLD AUTO: 0.04 10*3/MM3 (ref 0–0.2)
BASOPHILS NFR BLD AUTO: 0.6 % (ref 0–1.5)
BILIRUB SERPL-MCNC: 0.5 MG/DL (ref 0–1.2)
BUN SERPL-MCNC: 20 MG/DL (ref 6–20)
BUN/CREAT SERPL: 16.1 (ref 7–25)
CALCIUM SPEC-SCNC: 9.7 MG/DL (ref 8.6–10.5)
CHLORIDE SERPL-SCNC: 106 MMOL/L (ref 98–107)
CHOLEST SERPL-MCNC: 187 MG/DL (ref 0–200)
CO2 SERPL-SCNC: 24.5 MMOL/L (ref 22–29)
CREAT SERPL-MCNC: 1.24 MG/DL (ref 0.76–1.27)
DEPRECATED RDW RBC AUTO: 40.6 FL (ref 37–54)
EGFRCR SERPLBLD CKD-EPI 2021: 70 ML/MIN/1.73
EOSINOPHIL # BLD AUTO: 0.06 10*3/MM3 (ref 0–0.4)
EOSINOPHIL NFR BLD AUTO: 1 % (ref 0.3–6.2)
ERYTHROCYTE [DISTWIDTH] IN BLOOD BY AUTOMATED COUNT: 12.6 % (ref 12.3–15.4)
GLOBULIN UR ELPH-MCNC: 2.8 GM/DL
GLUCOSE SERPL-MCNC: 111 MG/DL (ref 65–99)
HCT VFR BLD AUTO: 50.6 % (ref 37.5–51)
HCV AB SER QL: NORMAL
HDLC SERPL-MCNC: 38 MG/DL (ref 40–60)
HGB BLD-MCNC: 17.2 G/DL (ref 13–17.7)
IMM GRANULOCYTES # BLD AUTO: 0.02 10*3/MM3 (ref 0–0.05)
IMM GRANULOCYTES NFR BLD AUTO: 0.3 % (ref 0–0.5)
LDLC SERPL CALC-MCNC: 131 MG/DL (ref 0–100)
LDLC/HDLC SERPL: 3.4 {RATIO}
LYMPHOCYTES # BLD AUTO: 1.88 10*3/MM3 (ref 0.7–3.1)
LYMPHOCYTES NFR BLD AUTO: 30.4 % (ref 19.6–45.3)
MCH RBC QN AUTO: 30.1 PG (ref 26.6–33)
MCHC RBC AUTO-ENTMCNC: 34 G/DL (ref 31.5–35.7)
MCV RBC AUTO: 88.5 FL (ref 79–97)
MONOCYTES # BLD AUTO: 0.44 10*3/MM3 (ref 0.1–0.9)
MONOCYTES NFR BLD AUTO: 7.1 % (ref 5–12)
NEUTROPHILS NFR BLD AUTO: 3.75 10*3/MM3 (ref 1.7–7)
NEUTROPHILS NFR BLD AUTO: 60.6 % (ref 42.7–76)
NRBC BLD AUTO-RTO: 0 /100 WBC (ref 0–0.2)
PLATELET # BLD AUTO: 309 10*3/MM3 (ref 140–450)
PMV BLD AUTO: 9.5 FL (ref 6–12)
POTASSIUM SERPL-SCNC: 4.1 MMOL/L (ref 3.5–5.2)
PROT SERPL-MCNC: 7.1 G/DL (ref 6–8.5)
PSA SERPL-MCNC: 0.47 NG/ML (ref 0–4)
RBC # BLD AUTO: 5.72 10*6/MM3 (ref 4.14–5.8)
SODIUM SERPL-SCNC: 139 MMOL/L (ref 136–145)
TRIGL SERPL-MCNC: 99 MG/DL (ref 0–150)
TSH SERPL DL<=0.05 MIU/L-ACNC: 4.49 UIU/ML (ref 0.27–4.2)
VLDLC SERPL-MCNC: 18 MG/DL (ref 5–40)
WBC NRBC COR # BLD AUTO: 6.19 10*3/MM3 (ref 3.4–10.8)

## 2024-10-22 PROCEDURE — 36415 COLL VENOUS BLD VENIPUNCTURE: CPT | Performed by: NURSE PRACTITIONER

## 2024-10-22 PROCEDURE — 80053 COMPREHEN METABOLIC PANEL: CPT | Performed by: NURSE PRACTITIONER

## 2024-10-22 PROCEDURE — 80061 LIPID PANEL: CPT | Performed by: NURSE PRACTITIONER

## 2024-10-22 PROCEDURE — 85025 COMPLETE CBC W/AUTO DIFF WBC: CPT | Performed by: NURSE PRACTITIONER

## 2024-10-22 PROCEDURE — 84443 ASSAY THYROID STIM HORMONE: CPT | Performed by: NURSE PRACTITIONER

## 2024-10-22 PROCEDURE — G0103 PSA SCREENING: HCPCS | Performed by: NURSE PRACTITIONER

## 2024-10-22 PROCEDURE — 86803 HEPATITIS C AB TEST: CPT | Performed by: NURSE PRACTITIONER

## 2024-10-22 NOTE — PROGRESS NOTES
Venipuncture Blood Specimen Collection  Venipuncture performed in [facility] by Alexandria Granados RN with good hemostasis. Patient tolerated the procedure well without complications.   10/22/24   Alexandria Granados RN

## 2024-10-24 RX ORDER — THYROID 90 MG/1
90 TABLET ORAL DAILY
Qty: 90 TABLET | Refills: 0 | Status: SHIPPED | OUTPATIENT
Start: 2024-10-24

## 2024-10-25 ENCOUNTER — HOSPITAL ENCOUNTER (OUTPATIENT)
Dept: MRI IMAGING | Facility: HOSPITAL | Age: 54
Discharge: HOME | End: 2024-10-25
Admitting: NURSE PRACTITIONER
Payer: OTHER GOVERNMENT

## 2024-10-25 DIAGNOSIS — G89.29 NECK PAIN, CHRONIC: ICD-10-CM

## 2024-10-25 DIAGNOSIS — M54.2 NECK PAIN, CHRONIC: ICD-10-CM

## 2024-10-25 PROCEDURE — 72141 MRI NECK SPINE W/O DYE: CPT

## 2024-10-28 ENCOUNTER — OFFICE VISIT (OUTPATIENT)
Dept: INTERNAL MEDICINE | Facility: CLINIC | Age: 54
End: 2024-10-28
Payer: OTHER GOVERNMENT

## 2024-10-28 VITALS
DIASTOLIC BLOOD PRESSURE: 78 MMHG | SYSTOLIC BLOOD PRESSURE: 116 MMHG | HEART RATE: 60 BPM | HEIGHT: 66 IN | WEIGHT: 209.6 LBS | TEMPERATURE: 98.4 F | OXYGEN SATURATION: 96 % | BODY MASS INDEX: 33.68 KG/M2

## 2024-10-28 DIAGNOSIS — R93.89 ABNORMAL MRI: Primary | ICD-10-CM

## 2024-10-28 DIAGNOSIS — R09.89 CHEST CONGESTION: Primary | ICD-10-CM

## 2024-10-28 DIAGNOSIS — M54.2 NECK PAIN: ICD-10-CM

## 2024-10-28 PROCEDURE — 99213 OFFICE O/P EST LOW 20 MIN: CPT | Performed by: PHYSICIAN ASSISTANT

## 2024-10-28 NOTE — ASSESSMENT & PLAN NOTE
Will get CXR and PFT's and have patient follow up with PCP in 1 month or after test have been completed.  If normal could consider pulmonology referral or possible cardiac workup including echo and stress test. If symptoms persist or worsen patient needs to call or return. If he develops chest pain would recommend immediate evaluation at the ED.

## 2024-10-28 NOTE — PROGRESS NOTES
"Chief Complaint  Chest Cavity  ( Has noticed it for a year now /Patient was running when he noticed the sound again back in June )    Subjective          Sivakumar Gray presents to [facility]    Chest congestion- symptoms started about a year ago.  He notices it when he runs mostly.  Patient describes it as \"a sloshing sounds like water in a bottle going back and forth\". It does not seem to bother him when he walks. He has had some shortness of breath with it as well, worse when going up stairs and running.  No chest pain.    Objective   Vital Signs:   /78   Pulse 60   Temp 98.4 °F (36.9 °C)   Ht 168.9 cm (66.5\")   Wt 95.1 kg (209 lb 9.6 oz)   SpO2 96%   BMI 33.33 kg/m²     Physical Exam  Vitals reviewed.   Constitutional:       Appearance: Normal appearance. He is well-developed.   HENT:      Head: Normocephalic and atraumatic.   Eyes:      Conjunctiva/sclera: Conjunctivae normal.      Pupils: Pupils are equal, round, and reactive to light.   Cardiovascular:      Rate and Rhythm: Normal rate and regular rhythm.      Heart sounds: No murmur heard.     No friction rub. No gallop.   Pulmonary:      Effort: Pulmonary effort is normal.      Breath sounds: Normal breath sounds. No wheezing or rhonchi.   Skin:     General: Skin is warm and dry.   Neurological:      Mental Status: He is alert and oriented to person, place, and time.      Cranial Nerves: No cranial nerve deficit.   Psychiatric:         Mood and Affect: Mood and affect normal.         Behavior: Behavior normal.         Thought Content: Thought content normal.         Judgment: Judgment normal.        Result Review :          Procedures      Assessment and Plan    Diagnoses and all orders for this visit:    1. Chest congestion (Primary)  Assessment & Plan:  Will get CXR and PFT's and have patient follow up with PCP in 1 month or after test have been completed.  If normal could consider pulmonology " referral or possible cardiac workup including echo and stress test. If symptoms persist or worsen patient needs to call or return. If he develops chest pain would recommend immediate evaluation at the ED.     Orders:  -     Complete PFT - Pre & Post Bronchodilator; Future  -     XR Chest PA & Lateral (In Office)              Follow Up   No follow-ups on file.  Patient was given instructions and counseling regarding his condition or for health maintenance advice. Please see specific information pulled into the AVS if appropriate.

## 2024-11-05 DIAGNOSIS — R09.89 CHEST CONGESTION: Primary | ICD-10-CM

## 2024-11-11 RX ORDER — THYROID,PORK 60 MG
TABLET ORAL
Qty: 30 TABLET | Refills: 0 | OUTPATIENT
Start: 2024-11-11

## 2024-11-26 ENCOUNTER — OFFICE VISIT (OUTPATIENT)
Dept: NEUROSURGERY | Facility: CLINIC | Age: 54
End: 2024-11-26
Payer: OTHER GOVERNMENT

## 2024-11-26 VITALS
HEART RATE: 91 BPM | BODY MASS INDEX: 33.48 KG/M2 | DIASTOLIC BLOOD PRESSURE: 77 MMHG | SYSTOLIC BLOOD PRESSURE: 135 MMHG | WEIGHT: 208.3 LBS | HEIGHT: 66 IN

## 2024-11-26 DIAGNOSIS — M50.30 DEGENERATIVE DISC DISEASE, CERVICAL: Primary | ICD-10-CM

## 2024-11-26 DIAGNOSIS — M54.12 CERVICAL RADICULOPATHY: ICD-10-CM
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
## 2024-11-27 ENCOUNTER — PATIENT ROUNDING (BHMG ONLY) (OUTPATIENT)
Dept: NEUROSURGERY | Facility: CLINIC | Age: 54
End: 2024-11-27
Payer: OTHER GOVERNMENT

## 2025-01-08 ENCOUNTER — APPOINTMENT (OUTPATIENT)
Dept: GENERAL RADIOLOGY | Facility: HOSPITAL | Age: 55
End: 2025-01-08
Payer: OTHER GOVERNMENT

## 2025-01-08 ENCOUNTER — HOSPITAL ENCOUNTER (EMERGENCY)
Facility: HOSPITAL | Age: 55
Discharge: HOME | End: 2025-01-08
Attending: EMERGENCY MEDICINE | Admitting: EMERGENCY MEDICINE
Payer: OTHER GOVERNMENT

## 2025-01-08 VITALS
DIASTOLIC BLOOD PRESSURE: 68 MMHG | WEIGHT: 213.85 LBS | TEMPERATURE: 98 F | OXYGEN SATURATION: 96 % | SYSTOLIC BLOOD PRESSURE: 114 MMHG | BODY MASS INDEX: 33.56 KG/M2 | HEIGHT: 67 IN | HEART RATE: 80 BPM | RESPIRATION RATE: 18 BRPM

## 2025-01-08 DIAGNOSIS — L03.031 PARONYCHIA, TOE, RIGHT: Primary | ICD-10-CM

## 2025-01-08 PROCEDURE — 99283 EMERGENCY DEPT VISIT LOW MDM: CPT

## 2025-01-08 PROCEDURE — 73660 X-RAY EXAM OF TOE(S): CPT

## 2025-01-08 NOTE — ED PROVIDER NOTES
Time: 5:33 PM EST  Date of encounter:  1/8/2025  Independent Historian/Clinical History and Information was obtained by:   Patient    History is limited by: N/A    No chief complaint on file.        History of Present Illness:  Patient is a 54 y.o. year old male who presents to the emergency department for evaluation of right toe pain.  Patient states that on Sunday he was clipping his toenails and feels like he might of gotten ingrown toenail.  Patient states he has been soaking it for the pain but has not had any relief.(Bailey Seaver, APRN, FNP-C)    Patient Care Team  Primary Care Provider: Luz Minor APRN    Past Medical History:     Allergies   Allergen Reactions    Amoxicillin Hives     Past Medical History:   Diagnosis Date    Acute lateral meniscal tear, left, initial encounter 03/09/2018    Condition not found 05/26/2018    aftercare following left knee arthroscopic revision ACL reconstruction with allograft and partial medial meniscectomy and chondroplasty 4/3/2018    Left ACL tear 03/09/2018    Left knee pain 03/09/2018    unspecified chronicity    Limb swelling     Medial meniscus tear 03/09/2018    SOB (shortness of breath)     Thyroid disorder      Past Surgical History:   Procedure Laterality Date    COLONOSCOPY N/A 12/19/2022    Procedure: COLONOSCOPY WITH POLYPECTOMIES HOT SNARE;  Surgeon: Donny Agustin MD;  Location: [facility] ENDOSCOPY;  Service: Gastroenterology;  Laterality: N/A;  DIVERTICULOSIS, COLON POLYPS    KNEE ACL RECONSTRUCTION Left     TWO KNEE RECONSTRUCTIONS 2011 & 2016    WISDOM TOOTH EXTRACTION       Family History   Problem Relation Age of Onset    Arthritis Sister     Colon cancer Neg Hx     Malig Hyperthermia Neg Hx        Home Medications:  Prior to Admission medications    Medication Sig Start Date End Date Taking? Authorizing Provider   tamsulosin (FLOMAX) 0.4 MG capsule 24 hr capsule Take 1 capsule by mouth Daily for 360 days.  Patient not taking: Reported on  "11/26/2024 1/31/24 1/25/25  Comfort Beach MD   Thyroid (ARMOUR THYROID) 90 MG PO tablet Take 1 tablet by mouth Daily. 10/24/24   Luz Minor APRN        Social History:   Social History     Tobacco Use    Smoking status: Never     Passive exposure: Never    Smokeless tobacco: Never   Vaping Use    Vaping status: Never Used   Substance Use Topics    Alcohol use: Yes     Alcohol/week: 1.0 standard drink of alcohol     Types: 1 Cans of beer per week     Comment: rarely drinks, less than 1 drink per day, has been drinking for 21-30 years    Drug use: Never         Review of Systems:  Review of Systems   Musculoskeletal:  Positive for joint swelling.        Physical Exam:  /65 (BP Location: Left arm, Patient Position: Sitting)   Pulse 79   Temp 98 °F (36.7 °C) (Oral)   Resp 16   Ht 168.9 cm (66.5\")   Wt 97 kg (213 lb 13.5 oz)   SpO2 95%   BMI 34.00 kg/m²         Physical Exam  Constitutional:       Appearance: Normal appearance.   HENT:      Head: Normocephalic.   Eyes:      Extraocular Movements: Extraocular movements intact.      Conjunctiva/sclera: Conjunctivae normal.   Pulmonary:      Effort: Pulmonary effort is normal.   Abdominal:      General: There is no distension.   Feet:      Right foot:      Skin integrity: Erythema (right great toe) present.   Skin:     General: Skin is warm.      Coloration: Skin is not cyanotic.   Neurological:      Mental Status: He is alert and oriented to person, place, and time.   Psychiatric:         Attention and Perception: Attention and perception normal.         Mood and Affect: Mood normal.                        Medical Decision Making:      Comorbidities that affect care:    {Comorbidities that affect care:72650}    External Notes reviewed:    {External Note review (Optional):65869}      The following orders were placed and all results were independently analyzed by me:  No orders of the defined types were placed in this encounter.      Medications Given " in the Emergency Department:  Medications - No data to display     ED Course:    The patient was initially evaluated in the triage area where orders were placed. The patient was later dispositioned by Alyce B Seaver, APRN.      The patient was advised to stay for completion of workup which includes but is not limited to communication of labs and radiological results, reassessment and plan. The patient was advised that leaving prior to disposition by a provider could result in critical findings that are not communicated to the patient.          Labs:    Lab Results (last 24 hours)       ** No results found for the last 24 hours. **             Imaging:    No Radiology Exams Resulted Within Past 24 Hours      Differential Diagnosis and Discussion:      Joint Pain: Differential diagnosis includes but is not limited to polyarticular arthritis, gout, tendinitis, hemarthrosis, septic arthritis, rheumatoid arthritis, bursitis, degenerative joint disease, joint effusion, autoimmune disorder, trauma, and occult neoplasm.    PROCEDURES:    {Independent Review of (Optional):38968}    No orders to display        Procedures    MDM         {CRITICAL CARE:42462}      {SEPSIS RECOGNITION:40075}    Patient Care Considerations:    {Considerations (Optional):39605}      Consultants/Shared Management Plan:    {Shared Management Plan (Optional):01375}    Social Determinants of Health:    Patient is independent, reliable, and has access to care.       Disposition and Care Coordination:    {Admission consideration:95474}    {Discharge (Optional):59356}    Final diagnoses:   None        ED Disposition       None            This medical record created using voice recognition software.

## 2025-01-08 NOTE — ED PROVIDER NOTES
Time: 6:22 PM EST  Date of encounter:  1/8/2025  Independent Historian/Clinical History and Information was obtained by:   Patient    History is limited by: N/A    Chief Complaint   Patient presents with    Toe Pain     Toe Pain w/ possible infection.         History of Present Illness:  Patient is a 54 y.o. year old male who presents to the emergency department for evaluation of right big toe erythema and swelling by the toenail that started this past Saturday.  Patient states that he cut his toenails and then was shoveling.  States he has been trying to soak it in salt water.    Patient Care Team  Primary Care Provider: Luz Minor APRN    Past Medical History:     Allergies   Allergen Reactions    Amoxicillin Hives     Past Medical History:   Diagnosis Date    Acute lateral meniscal tear, left, initial encounter 03/09/2018    Condition not found 05/26/2018    aftercare following left knee arthroscopic revision ACL reconstruction with allograft and partial medial meniscectomy and chondroplasty 4/3/2018    Left ACL tear 03/09/2018    Left knee pain 03/09/2018    unspecified chronicity    Limb swelling     Medial meniscus tear 03/09/2018    SOB (shortness of breath)     Thyroid disorder      Past Surgical History:   Procedure Laterality Date    COLONOSCOPY N/A 12/19/2022    Procedure: COLONOSCOPY WITH POLYPECTOMIES HOT SNARE;  Surgeon: Donny Agustin MD;  Location: [facility] ENDOSCOPY;  Service: Gastroenterology;  Laterality: N/A;  DIVERTICULOSIS, COLON POLYPS    KNEE ACL RECONSTRUCTION Left     TWO KNEE RECONSTRUCTIONS 2011 & 2016    WISDOM TOOTH EXTRACTION       Family History   Problem Relation Age of Onset    Arthritis Sister     Colon cancer Neg Hx     Malig Hyperthermia Neg Hx        Home Medications:  Prior to Admission medications    Medication Sig Start Date End Date Taking? Authorizing Provider   Thyroid (TIMBO THYROID) 90 MG PO tablet Take 1 tablet by mouth Daily. 10/24/24   Luz Minor APRN  "  tamsulosin (FLOMAX) 0.4 MG capsule 24 hr capsule Take 1 capsule by mouth Daily for 360 days.  Patient not taking: Reported on 11/26/2024 1/31/24 1/8/25  Comfort Beach MD        Social History:   Social History     Tobacco Use    Smoking status: Never     Passive exposure: Never    Smokeless tobacco: Never   Vaping Use    Vaping status: Never Used   Substance Use Topics    Alcohol use: Yes     Alcohol/week: 1.0 standard drink of alcohol     Types: 1 Cans of beer per week     Comment: rarely drinks, less than 1 drink per day, has been drinking for 21-30 years    Drug use: Never         Review of Systems:  Review of Systems   Constitutional: Negative.    HENT: Negative.     Eyes: Negative.    Respiratory: Negative.     Cardiovascular: Negative.    Gastrointestinal: Negative.    Endocrine: Negative.    Genitourinary: Negative.    Musculoskeletal: Negative.    Skin:  Positive for color change.   Allergic/Immunologic: Negative.    Neurological: Negative.    Hematological: Negative.    Psychiatric/Behavioral: Negative.          Physical Exam:  /65 (BP Location: Left arm, Patient Position: Sitting)   Pulse 79   Temp 98 °F (36.7 °C) (Oral)   Resp 16   Ht 168.9 cm (66.5\")   Wt 97 kg (213 lb 13.5 oz)   SpO2 95%   BMI 34.00 kg/m²         Physical Exam  Vitals and nursing note reviewed.   Constitutional:       Appearance: Normal appearance.   HENT:      Head: Normocephalic and atraumatic.      Nose: Nose normal.      Mouth/Throat:      Mouth: Mucous membranes are moist.   Eyes:      Extraocular Movements: Extraocular movements intact.      Pupils: Pupils are equal, round, and reactive to light.   Cardiovascular:      Rate and Rhythm: Normal rate and regular rhythm.      Heart sounds: Normal heart sounds.   Pulmonary:      Effort: Pulmonary effort is normal.      Breath sounds: Normal breath sounds.   Musculoskeletal:         General: Swelling present. Normal range of motion.      Cervical back: Normal range " of motion and neck supple.        Feet:    Feet:      Right foot:      Skin integrity: Erythema present.      Toenail Condition: Right toenails are normal.   Skin:     General: Skin is warm and dry.      Capillary Refill: Capillary refill takes less than 2 seconds.      Findings: Erythema present.   Neurological:      General: No focal deficit present.      Mental Status: He is alert and oriented to person, place, and time.   Psychiatric:         Mood and Affect: Mood normal.         Behavior: Behavior normal.                      Medical Decision Making:      Comorbidities that affect care:    None    External Notes reviewed:    Previous Clinic Note: Office visit 11/26/2024 with neurosurgery      The following orders were placed and all results were independently analyzed by me:  Orders Placed This Encounter   Procedures    XR Toe 2+ View Right       Medications Given in the Emergency Department:  Medications - No data to display     ED Course:    The patient was initially evaluated in the triage area where orders were placed. The patient was later dispositioned by Rich Perry PA-C.      The patient was advised to stay for completion of workup which includes but is not limited to communication of labs and radiological results, reassessment and plan. The patient was advised that leaving prior to disposition by a provider could result in critical findings that are not communicated to the patient.     ED Course as of 01/08/25 1824   Wed Jan 08, 2025   1734 PROVIDER IN TRIAGE  Patient was evaluated by me in triage, Bailey Seaver, APRN, FNP-C.  Orders were placed and patient is currently awaiting final results and disposition.   [AS]      ED Course User Index  [AS] Seaver, Alyce B, APRN       Labs:    Lab Results (last 24 hours)       ** No results found for the last 24 hours. **             Imaging:    XR Toe 2+ View Right    Result Date: 1/8/2025  XR TOE 2+ VW RIGHT Date of Exam: 1/8/2025 5:59 PM EST  Indication: Right great toe pain Comparison: None available. Findings: There is no acute fracture or dislocation. The joint spaces are well-maintained. There are no osseous lesions. There is soft tissue swelling involving the right great toe.     Impression: 1.No acute fracture or dislocation. 2.Soft tissue swelling. Electronically Signed: Poli Calhoun MD  1/8/2025 6:14 PM EST  Workstation ID: [id number]       Differential Diagnosis and Discussion:      Wound Evaluation: Differential diagnosis includes but is not limited to laceration, abrasion, puncture, burn, ulcer, cellulitis, abscess, vasculitis, malignancy, and rash.    PROCEDURES:    X-ray were performed in the emergency department and all X-ray impressions were independently interpreted by me.    No orders to display        Procedures    MDM                   Patient Care Considerations:    ANTIBIOTICS: I considered prescribing antibiotics as an outpatient however no bacterial focus of infection was found.  Discussed antimicrobial stewardship with the patient.    Consultants/Shared Management Plan:    None    Social Determinants of Health:    Patient is independent, reliable, and has access to care.       Disposition and Care Coordination:    Discharged: The patient is suitable and stable for discharge with no need for consideration of admission.    I have explained the patient´s condition, diagnoses and treatment plan based on the information available to me at this time. I have answered questions and addressed any concerns. The patient has a good  understanding of the patient´s diagnosis, condition, and treatment plan as can be expected at this point. The vital signs have been stable. The patient´s condition is stable and appropriate for discharge from the emergency department.      The patient will pursue further outpatient evaluation with the primary care physician or other designated or consulting physician as outlined in the discharge instructions. They  are agreeable to this plan of care and follow-up instructions have been explained in detail. The patient has received these instructions in written format and has expressed an understanding of the discharge instructions. The patient is aware that any significant change in condition or worsening of symptoms should prompt an immediate return to this or the closest emergency department or call to 911.    Final diagnoses:   Paronychia, toe, right        ED Disposition       ED Disposition   Discharge    Condition   Stable    Comment   --               This medical record created using voice recognition software.             Rich Perry PA-C  01/08/25 [id number]

## 2025-01-08 NOTE — DISCHARGE INSTRUCTIONS
Continue with warm water Epsom soaks.  No antibiotics needed.  Wear open toed shoes or loose shoes if possible. Do not drain on your own.     Follow-up with PCP as needed

## 2025-01-17 ENCOUNTER — HOSPITAL ENCOUNTER (OUTPATIENT)
Dept: RESPIRATORY THERAPY | Facility: HOSPITAL | Age: 55
Discharge: HOME | End: 2025-01-17
Payer: OTHER GOVERNMENT

## 2025-01-17 DIAGNOSIS — R09.89 CHEST CONGESTION: ICD-10-CM

## 2025-01-17 PROCEDURE — 94060 EVALUATION OF WHEEZING: CPT

## 2025-01-17 PROCEDURE — 94729 DIFFUSING CAPACITY: CPT

## 2025-01-17 PROCEDURE — 94726 PLETHYSMOGRAPHY LUNG VOLUMES: CPT

## 2025-01-17 RX ORDER — ALBUTEROL SULFATE 0.83 MG/ML
2.5 SOLUTION RESPIRATORY (INHALATION) ONCE
Status: COMPLETED | OUTPATIENT
Start: 2025-01-17 | End: 2025-01-17

## 2025-01-17 RX ADMIN — ALBUTEROL SULFATE 2.5 MG: 2.5 SOLUTION RESPIRATORY (INHALATION) at 08:13

## 2025-01-20 RX ORDER — THYROID 90 MG/1
90 TABLET ORAL DAILY
Qty: 90 TABLET | Refills: 0 | Status: SHIPPED | OUTPATIENT
Start: 2025-01-20 | End: 2025-01-23

## 2025-01-23 RX ORDER — THYROID 90 MG/1
90 TABLET ORAL DAILY
Qty: 90 TABLET | Refills: 0 | Status: SHIPPED | OUTPATIENT
Start: 2025-01-23

## 2025-01-27 RX ORDER — THYROID 90 MG/1
90 TABLET ORAL DAILY
Qty: 90 TABLET | Refills: 0 | OUTPATIENT
Start: 2025-01-27

## 2025-01-27 NOTE — TELEPHONE ENCOUNTER
Caller: cory macedo    Relationship: Emergency Contact    Best call back number: [phone]     Requested Prescriptions:   Requested Prescriptions     Pending Prescriptions Disp Refills    Thyroid 90 MG PO tablet 90 tablet 0     Sig: Take 1 tablet by mouth Daily.        Pharmacy where request should be sent: [id number] - [place], [place] - [address] [facility] AT [facility] OF [address] & [place] - [phone] [facility] [phone] FX     Last office visit with prescribing clinician: 9/17/2024   Last telemedicine visit with prescribing clinician: Visit date not found   Next office visit with prescribing clinician: Visit date not found     Additional details provided by patient: OUT OF MEDICATION    Does the patient have less than a 3 day supply:  [x] Yes  [] No    Would you like a call back once the refill request has been completed: [] Yes [] No    If the office needs to give you a call back, can they leave a voicemail: [] Yes [] No    Sudhakar Palacios Rep   01/27/25 10:40 EST

## 2025-01-29 ENCOUNTER — CLINICAL SUPPORT (OUTPATIENT)
Dept: INTERNAL MEDICINE | Facility: CLINIC | Age: 55
End: 2025-01-29
Payer: OTHER GOVERNMENT

## 2025-01-29 DIAGNOSIS — E03.9 HYPOTHYROIDISM, UNSPECIFIED TYPE: ICD-10-CM

## 2025-01-29 LAB
T4 FREE SERPL-MCNC: 0.69 NG/DL (ref 0.92–1.68)
TSH SERPL DL<=0.05 MIU/L-ACNC: 4.14 UIU/ML (ref 0.27–4.2)

## 2025-01-29 PROCEDURE — 84443 ASSAY THYROID STIM HORMONE: CPT | Performed by: NURSE PRACTITIONER

## 2025-01-29 PROCEDURE — 36415 COLL VENOUS BLD VENIPUNCTURE: CPT | Performed by: NURSE PRACTITIONER

## 2025-01-29 PROCEDURE — 84439 ASSAY OF FREE THYROXINE: CPT | Performed by: NURSE PRACTITIONER

## 2025-01-29 RX ORDER — THYROID 90 MG/1
90 TABLET ORAL DAILY
Qty: 90 TABLET | Refills: 0 | OUTPATIENT
Start: 2025-01-29

## 2025-01-29 NOTE — PROGRESS NOTES
Venipuncture Blood Specimen Collection  Venipuncture performed in LAC by Ramiro Best MA with good hemostasis. Patient tolerated the procedure well without complications.   01/29/25   Alexandria Granados RN

## 2025-02-17 ENCOUNTER — OFFICE VISIT (OUTPATIENT)
Dept: INTERNAL MEDICINE | Facility: CLINIC | Age: 55
End: 2025-02-17
Payer: OTHER GOVERNMENT

## 2025-02-17 VITALS
RESPIRATION RATE: 18 BRPM | HEIGHT: 67 IN | WEIGHT: 214 LBS | OXYGEN SATURATION: 95 % | HEART RATE: 110 BPM | TEMPERATURE: 97.3 F | BODY MASS INDEX: 33.59 KG/M2 | DIASTOLIC BLOOD PRESSURE: 80 MMHG | SYSTOLIC BLOOD PRESSURE: 120 MMHG

## 2025-02-17 DIAGNOSIS — R06.09 DYSPNEA ON EXERTION: Primary | ICD-10-CM

## 2025-02-17 DIAGNOSIS — L30.9 ECZEMA, UNSPECIFIED TYPE: ICD-10-CM

## 2025-02-17 DIAGNOSIS — E03.9 HYPOTHYROIDISM, UNSPECIFIED TYPE: ICD-10-CM

## 2025-02-17 PROCEDURE — 99214 OFFICE O/P EST MOD 30 MIN: CPT | Performed by: NURSE PRACTITIONER

## 2025-02-17 RX ORDER — EMOLLIENT BASE
1 CREAM (GRAM) TOPICAL DAILY PRN
Qty: 453 G | Refills: 1 | Status: SHIPPED | OUTPATIENT
Start: 2025-02-17

## 2025-02-17 NOTE — ASSESSMENT & PLAN NOTE
Orders:    Ambulatory Referral to Pulmonology    CT Chest Without Contrast; Future    Adult Transthoracic Echo Complete w/ Color, Spectral and Contrast if necessary per protocol; Future

## 2025-02-17 NOTE — PROGRESS NOTES
"Chief Complaint  Results (Discuss results from PFT)      Subjective      History of Present Illness  The patient is a 54-year-old male presenting for follow-up regarding dyspnea, particularly exacerbated by physical activity. Pulmonary function testing conducted on 01/17/2025 yielded normal results, and a chest x-ray performed in October 2024 was unremarkable.    The patient reports experiencing dyspnea for the past year, which is aggravated by exertion. He denies any recent respiratory infections or chest congestion. He occasionally takes deep breaths at rest but denies current chest pain. He recalls an episode of chest pain in the spring of 2014, which necessitated an emergency room visit and a subsequent two-day hospitalization; a treadmill stress test at that time was normal. Since retiring, he has reduced his physical activity, leading to weight gain. He is attempting to increase his activity level by walking and using the elliptical. He has a history of indigestion and previously experienced a sensation of fluid in his chest, which resolved after he ceased running. He used to sleep in an upright position but now sleeps on his side, noting an improvement in sleep quality, which he attributes to the resolution of sleep deprivation.    The patient also reports a dry skin lesion and requests a refill of his vanicream. He is unable to use perfumed hand creams or lotions.             Objective   Vital Signs:   Vitals:    02/17/25 1335   BP: 120/80   BP Location: Left arm   Patient Position: Sitting   Cuff Size: Adult   Pulse: 110   Resp: 18   Temp: 97.3 °F (36.3 °C)   TempSrc: Temporal   SpO2: 95%   Weight: 97.1 kg (214 lb)   Height: 168.9 cm (66.5\")     Body mass index is 34.02 kg/m².    Wt Readings from Last 3 Encounters:   02/17/25 97.1 kg (214 lb)   01/08/25 97 kg (213 lb 13.5 oz)   11/26/24 94.5 kg (208 lb 4.8 oz)     BP Readings from Last 3 Encounters:   02/17/25 120/80   01/08/25 114/68   11/26/24 135/77 "       Health Maintenance   Topic Date Due    TDAP/TD VACCINES (1 - Tdap) Never done    Pneumococcal Vaccine 50+ (1 of 1 - PCV) Never done    ZOSTER VACCINE (1 of 2) Never done    COVID-19 Vaccine (1 - 2024-25 season) Never done    INFLUENZA VACCINE  03/31/2025 (Originally 7/1/2024)    ANNUAL PHYSICAL  09/17/2025    BMI FOLLOWUP  09/17/2025    LIPID PANEL  10/22/2025    COLORECTAL CANCER SCREENING  12/19/2027    HEPATITIS C SCREENING  Completed       Physical Exam  Vitals and nursing note reviewed.   Constitutional:       General: He is not in acute distress.     Appearance: Normal appearance.   HENT:      Head: Normocephalic and atraumatic.      Right Ear: External ear normal.      Left Ear: External ear normal.      Nose: Nose normal.      Mouth/Throat:      Mouth: Mucous membranes are moist.   Eyes:      Conjunctiva/sclera: Conjunctivae normal.   Cardiovascular:      Rate and Rhythm: Normal rate and regular rhythm.      Pulses: Normal pulses.      Heart sounds: Normal heart sounds. No murmur heard.     No friction rub. No gallop.   Pulmonary:      Effort: Pulmonary effort is normal. No respiratory distress.      Breath sounds: No wheezing, rhonchi or rales.   Musculoskeletal:      Cervical back: Neck supple.      Right lower leg: No edema.      Left lower leg: No edema.   Skin:     General: Skin is warm and dry.   Neurological:      General: No focal deficit present.      Mental Status: He is alert and oriented to person, place, and time.   Psychiatric:         Mood and Affect: Mood normal.         Behavior: Behavior normal.        Physical Exam        Result Review :  The following data was reviewed by: JOO Diaz on 02/17/2025:         Results  Thyroid test within normal range.    Chest x-ray in October 2024 unremarkable.    Pulmonary function testing on 01/17/2025 normal, no obstruction or restriction, normal diffusion capacity.            Procedures            Assessment & Plan  Dyspnea on  exertion    Orders:    Ambulatory Referral to Pulmonology    CT Chest Without Contrast; Future    Adult Transthoracic Echo Complete w/ Color, Spectral and Contrast if necessary per protocol; Future    Eczema, unspecified type         Hypothyroidism, unspecified type              Assessment & Plan  1. Dyspnea  - Pulmonary function tests on 01/17/2025 normal  - Chest x-ray in October 2024 unremarkable  - Discussed deconditioning and weight gain as possible contributors  - Referral to pulmonologist  - Order CT chest without contrast and echocardiogram  - Advise gradual increase in physical activity, aiming for consistency  - Cease activity and seek immediate medical attention if chest pain or significant dyspnea occurs    2. eczema  - Prescribe Vanicream  - Avoid perfumed lotions or creams  - topical steroid as needed but limit use due to thinning skin and loss of pigment with routine use    3.  hypothyroidism  - Thyroid function normal  Continue current meds  Follow-up in 8 weeks    Patient or patient representative verbalized consent for the use of Ambient Listening during the visit with  JOO Diaz for chart documentation. 2/17/2025  15:07 EST      FOLLOW UP  Return in about 8 weeks (around 4/14/2025).  Patient was given instructions and counseling regarding his condition or for health maintenance advice. Please see specific information pulled into the AVS if appropriate.     JOO Diaz  02/17/25  15:08 EST    CURRENT & DISCONTINUED MEDICATIONS  Current Outpatient Medications   Medication Instructions    emollient cream 1 Application, Topical, Daily PRN    Thyroid (ARMOUR) 90 mg, Oral, Daily       There are no discontinued medications.

## 2025-03-10 ENCOUNTER — OFFICE VISIT (OUTPATIENT)
Dept: UROLOGY | Age: 55
End: 2025-03-10
Payer: OTHER GOVERNMENT

## 2025-03-10 VITALS — BODY MASS INDEX: 33.59 KG/M2 | WEIGHT: 214 LBS | HEIGHT: 67 IN

## 2025-03-10 DIAGNOSIS — N39.43 BENIGN PROSTATIC HYPERPLASIA (BPH) WITH POST-VOID DRIBBLING: Primary | ICD-10-CM

## 2025-03-10 DIAGNOSIS — N40.1 BENIGN PROSTATIC HYPERPLASIA (BPH) WITH POST-VOID DRIBBLING: Primary | ICD-10-CM

## 2025-03-10 LAB
BILIRUB BLD-MCNC: NEGATIVE MG/DL
CLARITY, POC: CLEAR
COLOR UR: YELLOW
EXPIRATION DATE: NORMAL
GLUCOSE UR STRIP-MCNC: NEGATIVE MG/DL
KETONES UR QL: NEGATIVE
LEUKOCYTE EST, POC: NEGATIVE
Lab: NORMAL
NITRITE UR-MCNC: NEGATIVE MG/ML
PH UR: 6.5 [PH] (ref 5–8)
PROT UR STRIP-MCNC: NEGATIVE MG/DL
RBC # UR STRIP: NEGATIVE /UL
SP GR UR: 1.02 (ref 1–1.03)
URINE VOLUME: NORMAL
UROBILINOGEN UR QL: NORMAL

## 2025-03-10 PROCEDURE — 99213 OFFICE O/P EST LOW 20 MIN: CPT | Performed by: UROLOGY

## 2025-03-10 PROCEDURE — 81003 URINALYSIS AUTO W/O SCOPE: CPT | Performed by: UROLOGY

## 2025-03-10 NOTE — PROGRESS NOTES
"Chief Complaint  Benign Prostatic Hypertrophy    Subjective            Sivakumar Gray presents to [facility]    History of Present Illness  The patient presents for evaluation of urinary issues.    He reports overall good health but has discontinued the use of Flomax, a medication previously prescribed to him. He notes that the medication was effective initially, but he decided to stop its use for a period of 1 to 2 weeks. During this time, he experienced occasional urinary issues, which he managed by applying pressure behind the sac. He expresses uncertainty about the duration for which he can safely use Flomax.    MEDICATIONS  Discontinued: Flomax       Objective   Vital Signs:   Ht 168.9 cm (66.5\")   Wt 97.1 kg (214 lb)   BMI 34.02 kg/m²       Physical Exam  Vitals and nursing note reviewed.   Constitutional:       Appearance: Normal appearance. He is well-developed.   Pulmonary:      Effort: Pulmonary effort is normal.      Breath sounds: Normal air entry.   Neurological:      Mental Status: He is alert and oriented to person, place, and time.      Motor: Motor function is intact.   Psychiatric:         Mood and Affect: Mood normal.         Behavior: Behavior normal.          Result Review :   The following data was reviewed by: Comfort Beach MD on 03/10/2025:    Results for orders placed or performed in visit on 03/10/25   POC Urinalysis Dipstick, Automated    Collection Time: 03/10/25  2:59 PM    Specimen: Urine   Result Value Ref Range    Color Yellow Yellow, Straw, Dark Yellow, Mary    Clarity, UA Clear Clear    Specific Gravity  1.025 1.005 - 1.030    pH, Urine 6.5 5.0 - 8.0    Leukocytes Negative Negative    Nitrite, UA Negative Negative    Protein, POC Negative Negative mg/dL    Glucose, UA Negative Negative mg/dL    Ketones, UA Negative Negative    Urobilinogen, UA 0.2 E.U./dL Normal, 0.2 E.U./dL    Bilirubin Negative Negative    Blood, UA Negative Negative    Lot Number " [id number]     Expiration Date 92,025    Bladder Scan    Collection Time: 03/10/25  2:59 PM   Result Value Ref Range    Urine Volume 0ml      PSA          10/22/2024    10:40   PSA   PSA 0.468        Results  Laboratory Studies  PSA was 0.4 in October.    Imaging  Bladder scan showed a post-void residual of zero.     Bladder Scan interpretation 03/10/2025    Estimation of residual urine via BVI 3000 Verathon Bladder Scan  MA/nurse performing: Alessandra Ventura MA  Residual Urine: 0 ml  Indication: Benign prostatic hyperplasia (BPH) with post-void dribbling   Position: Supine  Examination: Incremental scanning of the suprapubic area using 2.0 MHz transducer using copious amounts of acoustic gel.   Findings: An anechoic area was demonstrated which represented the bladder, with measurement of residual urine as noted. I inspected this myself. In that the residual urine was stable or insignificant, refer to plan for treatment and plan necessary at this time.              Assessment and Plan    Diagnoses and all orders for this visit:    1. Benign prostatic hyperplasia (BPH) with post-void dribbling (Primary)  -     POC Urinalysis Dipstick, Automated  -     Bladder Scan      Assessment & Plan  1.  BPH/prostate cancer screening.  His Prostate-Specific Antigen (PSA) levels are within the normal range, indicating no immediate concern for prostate cancer. The bladder scan conducted today post-urination showed a post-void residual of zero, which is an excellent result. He has not been using Flomax regularly but keeps it on hand for occasional use. Given his age and current symptomatology, it is acceptable for him to manage his condition without daily Flomax use. However, it is anticipated that he may require regular use of Flomax in the future. He is advised to contact the office if he experiences frequent urinary issues necessitating the resumption of Flomax.    Follow-up  The patient will follow up in 1 year or sooner if  needed.          Follow Up       No follow-ups on file.  Patient was given instructions and counseling regarding his condition or for health maintenance advice. Please see specific information pulled into the AVS if appropriate.     Transcribed from ambient dictation for Comfort Beach MD by Comfort Beach MD.  03/10/25   15:21 EDT    Patient or patient representative verbalized consent for the use of Ambient Listening during the visit with  Comfort Beach MD for chart documentation. 3/10/2025  15:21 EDT

## 2025-03-10 NOTE — PROGRESS NOTES
Primary Care Provider  Luz Minor APRN   Referring Provider  JOO Diaz      Patient Complaint  Establish Care, Shortness of Breath (Started about a year- with exertion), and Results (PFT 01/17/25)    Subjective          Sivakumar Gray presents to [facility]ULMONARY & [facility]ARE MEDICINE    Patient or patient representative verbalized consent for the use of Ambient Listening during the visit with  JOO Guillermo for chart documentation. 3/14/2025  08:27 EDT    History of Presenting Illness  Sivakumar Gray is a 54 y.o. male patient with exertional dyspnea, here to establish care.    History of Present Illness  The patient presents for evaluation of shortness of breath. He has been experiencing noticeable dyspnea for over a year, which he reports as not progressively worsening but occurs during physical activities such as running or yard work. He describes once hearing an audible fluid-like sound in his chest during exercise. He speculates that his symptoms may be related to a sudden cessation of regular exercise. He has no history of hospitalization due to respiratory issues. 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 He reports no recent changes in his home environment or significant environmental allergies. He first noticed his symptoms at the age of 42, coinciding with a period of intense physical activity. He has never used inhalers and has not undergone a cardiac evaluation. He recalls an episode of chest pain 10 years ago, following his jail and discontinuation of his thyroid medication, which resulted in an emergency room visit and EKG. He has a CT scan scheduled for later this month. He does not believe he requires a rescue inhaler at this time.    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    FAMILY HISTORY  He does not have a family history of lung disease, COPD, asthma, or lung cancer.    Pulmonary Meds  None    Pulmonary Equipment  None      Family History   Problem Relation Age of Onset    Arthritis Sister     Colon cancer Neg Hx     Malig Hyperthermia Neg Hx         Social History     Socioeconomic History    Marital status:    Tobacco Use    Smoking status: Never     Passive exposure: Never    Smokeless tobacco: Never   Vaping Use    Vaping status: Never Used   Substance and Sexual Activity    Alcohol use: Yes     Alcohol/week: 1.0 standard drink of alcohol     Types: 1 Cans of beer per week     Comment: rarely drinks, less than 1 drink per day, has been drinking for 21-30 years    Drug use: Never    Sexual activity: Defer        Past Medical History:   Diagnosis Date    Acute lateral meniscal tear, left, initial encounter 03/09/2018    Condition not found 05/26/2018    aftercare following left knee arthroscopic revision ACL reconstruction with allograft and partial medial meniscectomy and chondroplasty 4/3/2018    Left ACL tear 03/09/2018    Left knee pain 03/09/2018    unspecified chronicity    Limb swelling     Medial meniscus tear 03/09/2018    SOB (shortness of breath)     Thyroid disorder         Immunization History   Administered Date(s) Administered    Flu Vaccine Quad PF >36MO 10/16/2020    Fluzone (or Fluarix & Flulaval for VFC) >6mos 10/16/2020    Influenza, Unspecified 10/17/2019       Allergies   Allergen Reactions    Amoxicillin Hives          Current Outpatient Medications:     THYROID 90 MG PO tablet, TAKE 1 TABLET BY MOUTH DAILY, Disp: 90 tablet, Rfl: 0    emollient cream, Apply 1 Application topically to the appropriate area as directed Daily As Needed for Dry Skin. (Patient not taking: Reported on 3/14/2025), Disp: 453 g, Rfl: 1       Objective     Vitals:    03/14/25 0812   BP: 128/85   Pulse: 63   Resp: 16   Temp: 97.5 °F (36.4 °C)   SpO2: 96%       Physical  Exam  Constitutional:       General: He is not in acute distress.     Appearance: Normal appearance. He is obese. He is not ill-appearing.   HENT:      Right Ear: Tympanic membrane and ear canal normal.      Left Ear: Tympanic membrane and ear canal normal.      Nose: Nose normal.      Mouth/Throat:      Mouth: Mucous membranes are moist.      Pharynx: Oropharynx is clear.   Cardiovascular:      Rate and Rhythm: Normal rate and regular rhythm.      Pulses: Normal pulses.      Heart sounds: Normal heart sounds.   Pulmonary:      Effort: Pulmonary effort is normal. No respiratory distress.      Breath sounds: Normal breath sounds. No stridor. No wheezing, rhonchi or rales.   Musculoskeletal:         General: No swelling. Normal range of motion.      Cervical back: Normal range of motion and neck supple.      Right lower leg: No edema.      Left lower leg: No edema.   Skin:     General: Skin is warm and dry.   Neurological:      General: No focal deficit present.      Mental Status: He is alert and oriented to person, place, and time.      Motor: No weakness.   Psychiatric:         Mood and Affect: Mood normal.         Behavior: Behavior normal.          Result Review :   I have personally reviewed patient's labs and images.     Results    -PFTs 1/17/2025 showed normal spirometry without significant response to bronchodilator.  Lung volumes normal, DLCO normal.       Assessment and Plan        Diagnoses and all orders for this visit:    1. Dyspnea on exertion (Primary)  -     Alpha - 1 - Antitrypsin; Future  -     Bronchial Challenge With Methacholine; Future    2. Obesity (BMI 30-39.9)       Assessment & Plan  1. Dyspnea.  His pulmonary function test results from January 2025 were within normal limits, effectively ruling out COPD.  However, these results do not definitively exclude the possibility of asthma or exercise-induced asthma. His lung volumes are satisfactory, indicating appropriate lung expansion and air  filling. The diffusing capacity of oxygen is also normal. His vital signs, including blood pressure and oxygen saturation at 96%, are within normal ranges today. Some of his symptoms could be attributed to deconditioning due to reduced physical activity, but the severity of his symptoms suggests a potential underlying condition. A screening for alpha-1 deficiency will be conducted today. He is advised to maintain his scheduled chest CT scan appointment later this month. A methacholine challenge test will be ordered to definitively rule out asthma. He has declined the use of a rescue inhaler at this time. If the CT scan and histamine challenge test results are normal, a cardiac evaluation will be recommended. If a diagnosis of asthma is confirmed, an appropriate treatment plan will be discussed.    -Follow up in 2-3 months after testing completed    Smoking history reviewed.  Vaccination status: Patient declines vaccines.  Patient is advised to continue to follow CDC recommendations such as social distancing wearing a mask and washing hands for at least 20 seconds.  Medications personally reviewed.    Follow Up   No follow-ups on file.  Patient was given instructions and counseling regarding his condition or for health maintenance advice. Please see specific information pulled into the AVS if appropriate.

## 2025-03-14 ENCOUNTER — OFFICE VISIT (OUTPATIENT)
Dept: PULMONOLOGY | Facility: CLINIC | Age: 55
End: 2025-03-14
Payer: OTHER GOVERNMENT

## 2025-03-14 VITALS
BODY MASS INDEX: 33.75 KG/M2 | TEMPERATURE: 97.5 F | OXYGEN SATURATION: 96 % | DIASTOLIC BLOOD PRESSURE: 85 MMHG | WEIGHT: 210 LBS | RESPIRATION RATE: 16 BRPM | HEART RATE: 63 BPM | SYSTOLIC BLOOD PRESSURE: 128 MMHG | HEIGHT: 66 IN

## 2025-03-14 DIAGNOSIS — R06.09 DYSPNEA ON EXERTION: Primary | Chronic | ICD-10-CM

## 2025-03-14 DIAGNOSIS — E66.9 OBESITY (BMI 30-39.9): ICD-10-CM

## 2025-04-17 RX ORDER — METHACHOLINE CHLORIDE 0.1875/3ML
3 VIAL, NEBULIZER (ML) INHALATION
Status: ACTIVE | OUTPATIENT
Start: 2025-04-23 | End: 2025-04-23

## 2025-04-17 RX ORDER — METHACHOLINE CHLORIDE 0 MG/3 ML
3 VIAL, NEBULIZER (ML) INHALATION
Status: ACTIVE | OUTPATIENT
Start: 2025-04-23 | End: 2025-04-23

## 2025-04-17 RX ORDER — ALBUTEROL SULFATE 0.83 MG/ML
2.5 SOLUTION RESPIRATORY (INHALATION) ONCE
Status: DISCONTINUED | OUTPATIENT
Start: 2025-04-23 | End: 2025-04-26 | Stop reason: HOSPADM

## 2025-04-17 RX ORDER — METHACHOLINE CHLORIDE 0.75/3ML
3 VIAL, NEBULIZER (ML) INHALATION
Status: ACTIVE | OUTPATIENT
Start: 2025-04-23 | End: 2025-04-23

## 2025-04-17 RX ORDER — METHACHOLINE CHLORIDE 12 MG/3 ML
3 VIAL, NEBULIZER (ML) INHALATION
Status: ACTIVE | OUTPATIENT
Start: 2025-04-23 | End: 2025-04-23

## 2025-04-17 RX ORDER — ALBUTEROL SULFATE 0.83 MG/ML
2.5 SOLUTION RESPIRATORY (INHALATION) ONCE AS NEEDED
Status: DISCONTINUED | OUTPATIENT
Start: 2025-04-23 | End: 2025-04-26 | Stop reason: HOSPADM

## 2025-04-17 RX ORDER — METHACHOLINE CHLORIDE 3 MG/3 ML
3 VIAL, NEBULIZER (ML) INHALATION
Status: ACTIVE | OUTPATIENT
Start: 2025-04-23 | End: 2025-04-23

## 2025-04-17 RX ORDER — SODIUM CHLORIDE FOR INHALATION 0.9 %
3 VIAL, NEBULIZER (ML) INHALATION ONCE AS NEEDED
Status: DISCONTINUED | OUTPATIENT
Start: 2025-04-23 | End: 2025-04-26 | Stop reason: HOSPADM

## 2025-04-17 RX ORDER — EPINEPHRINE 0.3 MG/.3ML
0.3 INJECTION SUBCUTANEOUS ONCE AS NEEDED
Status: DISCONTINUED | OUTPATIENT
Start: 2025-04-23 | End: 2025-04-26 | Stop reason: HOSPADM

## 2025-04-17 RX ORDER — METHACHOLINE CHLORIDE 48 MG/3 ML
3 VIAL, NEBULIZER (ML) INHALATION
Status: ACTIVE | OUTPATIENT
Start: 2025-04-23 | End: 2025-04-23

## 2025-04-23 ENCOUNTER — HOSPITAL ENCOUNTER (OUTPATIENT)
Dept: RESPIRATORY THERAPY | Facility: HOSPITAL | Age: 55
Discharge: HOME | End: 2025-04-23
Payer: OTHER GOVERNMENT

## 2025-04-28 RX ORDER — THYROID 90 MG/1
90 TABLET ORAL DAILY
Qty: 90 TABLET | Refills: 0 | Status: SHIPPED | OUTPATIENT
Start: 2025-04-28

## 2025-05-05 RX ORDER — METHACHOLINE CHLORIDE 3 MG/3 ML
3 VIAL, NEBULIZER (ML) INHALATION
Status: COMPLETED | OUTPATIENT
Start: 2025-05-07 | End: 2025-05-07

## 2025-05-05 RX ORDER — METHACHOLINE CHLORIDE 12 MG/3 ML
3 VIAL, NEBULIZER (ML) INHALATION
Status: COMPLETED | OUTPATIENT
Start: 2025-05-07 | End: 2025-05-07

## 2025-05-05 RX ORDER — ALBUTEROL SULFATE 0.83 MG/ML
2.5 SOLUTION RESPIRATORY (INHALATION) ONCE
Status: COMPLETED | OUTPATIENT
Start: 2025-05-07 | End: 2025-05-07

## 2025-05-05 RX ORDER — EPINEPHRINE 0.3 MG/.3ML
0.3 INJECTION SUBCUTANEOUS ONCE AS NEEDED
Status: DISCONTINUED | OUTPATIENT
Start: 2025-05-07 | End: 2025-05-08 | Stop reason: HOSPADM

## 2025-05-05 RX ORDER — METHACHOLINE CHLORIDE 0.1875/3ML
3 VIAL, NEBULIZER (ML) INHALATION
Status: COMPLETED | OUTPATIENT
Start: 2025-05-07 | End: 2025-05-07

## 2025-05-05 RX ORDER — ALBUTEROL SULFATE 0.83 MG/ML
2.5 SOLUTION RESPIRATORY (INHALATION) ONCE AS NEEDED
Status: DISCONTINUED | OUTPATIENT
Start: 2025-05-07 | End: 2025-05-08 | Stop reason: HOSPADM

## 2025-05-05 RX ORDER — METHACHOLINE CHLORIDE 0 MG/3 ML
3 VIAL, NEBULIZER (ML) INHALATION
Status: COMPLETED | OUTPATIENT
Start: 2025-05-07 | End: 2025-05-07

## 2025-05-05 RX ORDER — METHACHOLINE CHLORIDE 48 MG/3 ML
3 VIAL, NEBULIZER (ML) INHALATION
Status: COMPLETED | OUTPATIENT
Start: 2025-05-07 | End: 2025-05-07

## 2025-05-05 RX ORDER — SODIUM CHLORIDE FOR INHALATION 0.9 %
3 VIAL, NEBULIZER (ML) INHALATION ONCE AS NEEDED
Status: DISCONTINUED | OUTPATIENT
Start: 2025-05-07 | End: 2025-05-08 | Stop reason: HOSPADM

## 2025-05-05 RX ORDER — METHACHOLINE CHLORIDE 0.75/3ML
3 VIAL, NEBULIZER (ML) INHALATION
Status: COMPLETED | OUTPATIENT
Start: 2025-05-07 | End: 2025-05-07

## 2025-05-07 ENCOUNTER — HOSPITAL ENCOUNTER (OUTPATIENT)
Dept: RESPIRATORY THERAPY | Facility: HOSPITAL | Age: 55
Discharge: HOME | End: 2025-05-07
Payer: OTHER GOVERNMENT

## 2025-05-07 DIAGNOSIS — R06.09 DYSPNEA ON EXERTION: Chronic | ICD-10-CM

## 2025-05-07 PROCEDURE — 94070 EVALUATION OF WHEEZING: CPT

## 2025-05-07 RX ADMIN — ALBUTEROL SULFATE 2.5 MG: 2.5 SOLUTION RESPIRATORY (INHALATION) at 12:52

## 2025-05-07 RX ADMIN — Medication 0.75 MG: at 12:30

## 2025-05-07 RX ADMIN — Medication 3 MG: at 12:35

## 2025-05-07 RX ADMIN — Medication 0.19 MG: at 12:25

## 2025-05-07 RX ADMIN — Medication 12 MG: at 12:40

## 2025-05-07 RX ADMIN — Medication 48 MG: at 12:47

## 2025-05-07 RX ADMIN — Medication 3 ML: at 12:19

## 2025-05-09 NOTE — PROGRESS NOTES
Primary Care Provider  Luz Minor APRN   Referring Provider  No ref. provider found      Patient Complaint  Follow-up (2 month) and Results (Alpha 1 and Bronchial Challenge)    Subjective          Sivakumar Gray presents to [facility]ULMON[facility] CRITICAL CARE MEDICINE      Patient or patient representative verbalized consent for the use of Ambient Listening during the visit with  JOO Guillermo for chart documentation. 5/23/2025  15:08 EDT    History of Presenting Illness  Sivakumar Gray is a 54 y.o. male patient with exertional dyspnea, here for 2 month follow up.    History of Present Illness    Patient states he is doing well since his last visit, here today to go over methacholine challenge results.  We discussed that his methacholine challenge 5/7/2025 was negative for asthma/reactive airway disease.  He was last seen as a new patient, referred to our clinic for shortness of breath. He has been experiencing noticeable dyspnea for over a year, which he reports as not progressively worsening but occurs during physical activities such as running or yard work. He describes once hearing an audible fluid-like sound in his chest during exercise. He speculates that his symptoms may be related to a sudden stopping of regular exercise. He has no history of hospitalization due to respiratory issues. 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 He reports no recent changes in his home environment or significant environmental allergies. He first noticed his symptoms at the age of 42, coinciding with a period of intense physical activity. He has never used inhalers and has not undergone a cardiac evaluation. He recalls an episode of chest pain 10 years ago, following his [facility] and discontinuation of his thyroid medication, which resulted in an emergency room visit and  EKG.    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    FAMILY HISTORY  He does not have a family history of lung disease, COPD, asthma, or lung cancer.    Pulmonary Meds  None    Pulmonary Equipment  None      Family History   Problem Relation Age of Onset    Arthritis Sister     Colon cancer Neg Hx     Malig Hyperthermia Neg Hx         Social History     Socioeconomic History    Marital status:    Tobacco Use    Smoking status: Never     Passive exposure: Never    Smokeless tobacco: Never   Vaping Use    Vaping status: Never Used   Substance and Sexual Activity    Alcohol use: Yes     Alcohol/week: 1.0 standard drink of alcohol     Types: 1 Cans of beer per week     Comment: rarely drinks, less than 1 drink per day, has been drinking for 21-30 years    Drug use: Never    Sexual activity: Defer        Past Medical History:   Diagnosis Date    Acute lateral meniscal tear, left, initial encounter 03/09/2018    Condition not found 05/26/2018    aftercare following left knee arthroscopic revision ACL reconstruction with allograft and partial medial meniscectomy and chondroplasty 4/3/2018    Left ACL tear 03/09/2018    Left knee pain 03/09/2018    unspecified chronicity    Limb swelling     Medial meniscus tear 03/09/2018    SOB (shortness of breath)     Thyroid disorder         Immunization History   Administered Date(s) Administered    Flu Vaccine Quad PF >36MO 10/16/2020    Fluzone (or Fluarix & Flulaval for VFC) >6mos 10/16/2020    Influenza, Unspecified 10/17/2019       Allergies   Allergen Reactions    Amoxicillin Hives          Current Outpatient Medications:     THYROID 90 MG PO tablet, TAKE 1 TABLET BY MOUTH DAILY, Disp: 90 tablet, Rfl: 0       Objective     Vitals:    05/23/25 1446   BP: 123/87   Pulse: 68   Resp: 16   Temp: 98 °F (36.7 °C)   SpO2: 94%         Physical Exam  Constitutional:       General: He is not in acute distress.     Appearance:  Normal appearance. He is obese. He is not ill-appearing.   HENT:      Right Ear: Tympanic membrane and ear canal normal.      Left Ear: Tympanic membrane and ear canal normal.      Nose: Nose normal.      Mouth/Throat:      Mouth: Mucous membranes are moist.      Pharynx: Oropharynx is clear.   Cardiovascular:      Rate and Rhythm: Normal rate and regular rhythm.      Pulses: Normal pulses.      Heart sounds: Normal heart sounds.   Pulmonary:      Effort: Pulmonary effort is normal. No respiratory distress.      Breath sounds: Normal breath sounds. No stridor. No wheezing, rhonchi or rales.   Musculoskeletal:         General: No swelling. Normal range of motion.      Cervical back: Normal range of motion and neck supple.      Right lower leg: No edema.      Left lower leg: No edema.   Skin:     General: Skin is warm and dry.   Neurological:      General: No focal deficit present.      Mental Status: He is alert and oriented to person, place, and time.      Motor: No weakness.   Psychiatric:         Mood and Affect: Mood normal.         Behavior: Behavior normal.          Result Review :   I have personally reviewed patient's labs and images.  I also reviewed my last progress note 3/14/2025.    Results    -Alpha-1 antitrypsin MS, 99 mg/dL  -PFTs 1/17/2025 showed normal spirometry without significant response to bronchodilator.  Lung volumes normal, DLCO normal.  -Bronchial challenge with methacholine 5/7/2025 negative for asthma/reactive airway disease       Diagnoses and all orders for this visit:    1. Dyspnea on exertion (Primary)  -     CT Chest With & Hi Resolution Wo; Future    2. Alpha-1-antitrypsin deficiency carrier    3. Obesity (BMI 30-39.9)      Assessment & Plan  1. Dyspnea.  His pulmonary function test results from January 2025 were within normal limits, effectively ruling out COPD.  A bronchial challenge with methacholine was ordered to definitively rule in or out asthma, was negative for  asthma/reactive airway disease.  His lung volumes are satisfactory, indicating appropriate lung expansion and air filling. The diffusing capacity of oxygen is also normal. His vital signs, including blood pressure and oxygen saturation at 94%, are within normal ranges today. Some of his symptoms could be attributed to deconditioning due to reduced physical activity, but the severity of his symptoms suggests a potential underlying condition. A screening for alpha-1 deficiency showed that he is a carrier of 1 abnormal copy of the gene, but his levels are normal.  He does not have any biological children.  I will order a high-resolution chest CT with contrast to check for any structural abnormalities in the lungs.  He has declined the use of a rescue inhaler at this time. If the CT scan results are normal, a cardiac evaluation will be recommended.    -Follow up with MD    Smoking history reviewed.  Vaccination status: Patient declines vaccines.  Patient is advised to continue to follow CDC recommendations such as social distancing wearing a mask and washing hands for at least 20 seconds.  Medications personally reviewed.    Follow Up   No follow-ups on file.  Patient was given instructions and counseling regarding his condition or for health maintenance advice. Please see specific information pulled into the AVS if appropriate.

## 2025-05-23 ENCOUNTER — OFFICE VISIT (OUTPATIENT)
Dept: PULMONOLOGY | Facility: CLINIC | Age: 55
End: 2025-05-23
Payer: OTHER GOVERNMENT

## 2025-05-23 VITALS
SYSTOLIC BLOOD PRESSURE: 123 MMHG | WEIGHT: 208.2 LBS | DIASTOLIC BLOOD PRESSURE: 87 MMHG | BODY MASS INDEX: 33.46 KG/M2 | RESPIRATION RATE: 16 BRPM | HEART RATE: 68 BPM | OXYGEN SATURATION: 94 % | HEIGHT: 66 IN | TEMPERATURE: 98 F

## 2025-05-23 DIAGNOSIS — Z14.8 ALPHA-1-ANTITRYPSIN DEFICIENCY CARRIER: ICD-10-CM

## 2025-05-23 DIAGNOSIS — E66.9 OBESITY (BMI 30-39.9): ICD-10-CM

## 2025-05-23 DIAGNOSIS — R06.09 DYSPNEA ON EXERTION: Primary | ICD-10-CM

## 2025-05-23 PROCEDURE — 99214 OFFICE O/P EST MOD 30 MIN: CPT

## 2025-07-11 RX ORDER — LEVOTHYROXINE, LIOTHYRONINE 57; 13.5 UG/1; UG/1
90 TABLET ORAL DAILY
Qty: 90 TABLET | Refills: 0 | Status: SHIPPED | OUTPATIENT
Start: 2025-07-11

## 2025-07-17 ENCOUNTER — HOSPITAL ENCOUNTER (OUTPATIENT)
Dept: CT IMAGING | Facility: HOSPITAL | Age: 55
Discharge: HOME | End: 2025-07-17
Payer: OTHER GOVERNMENT

## 2025-07-17 ENCOUNTER — TELEPHONE (OUTPATIENT)
Dept: INTERNAL MEDICINE | Facility: CLINIC | Age: 55
End: 2025-07-17
Payer: OTHER GOVERNMENT

## 2025-07-17 DIAGNOSIS — R06.09 DYSPNEA ON EXERTION: ICD-10-CM

## 2025-07-17 PROCEDURE — 71250 CT THORAX DX C-: CPT

## 2025-07-17 NOTE — TELEPHONE ENCOUNTER
Caller: cory macedo Ok    Relationship: Emergency Contact    Best call back number: [phone]     What is the medical concern/diagnosis: NECK PAIN    What specialty or service is being requested: PAIN MANAGEMENT FOR NECK INJECTIONS    What is the provider, practice or medical service name: [facility]    What is the office location: [place]    What is the office phone number: [phone]    Any additional details: PATIENT REFERRAL HAS

## 2025-08-01 DIAGNOSIS — N28.9 RENAL LESION: Primary | ICD-10-CM

## 2025-08-08 ENCOUNTER — OFFICE VISIT (OUTPATIENT)
Dept: PULMONOLOGY | Facility: CLINIC | Age: 55
End: 2025-08-08
Payer: OTHER GOVERNMENT

## 2025-08-08 VITALS
HEIGHT: 66 IN | WEIGHT: 206 LBS | SYSTOLIC BLOOD PRESSURE: 137 MMHG | BODY MASS INDEX: 33.11 KG/M2 | RESPIRATION RATE: 16 BRPM | DIASTOLIC BLOOD PRESSURE: 77 MMHG | OXYGEN SATURATION: 94 % | HEART RATE: 70 BPM | TEMPERATURE: 97.7 F

## 2025-08-08 DIAGNOSIS — R06.09 DYSPNEA ON EXERTION: ICD-10-CM

## 2025-08-08 DIAGNOSIS — J47.9 BRONCHIECTASIS WITHOUT COMPLICATION: Primary | ICD-10-CM

## 2025-08-08 DIAGNOSIS — Z14.8 ALPHA-1-ANTITRYPSIN DEFICIENCY CARRIER: ICD-10-CM

## 2025-08-08 DIAGNOSIS — E66.9 OBESITY (BMI 30-39.9): ICD-10-CM

## 2025-08-08 DIAGNOSIS — J21.9 BRONCHIOLITIS: ICD-10-CM

## 2025-08-08 PROCEDURE — 99214 OFFICE O/P EST MOD 30 MIN: CPT

## 2025-08-08 RX ORDER — PREDNISONE 20 MG/1
40 TABLET ORAL DAILY
Qty: 14 TABLET | Refills: 0 | Status: SHIPPED | OUTPATIENT
Start: 2025-08-08

## 2025-08-15 ENCOUNTER — HOSPITAL ENCOUNTER (OUTPATIENT)
Dept: ULTRASOUND IMAGING | Facility: HOSPITAL | Age: 55
Discharge: HOME | End: 2025-08-15
Admitting: UROLOGY
Payer: OTHER GOVERNMENT

## 2025-08-15 DIAGNOSIS — N28.9 RENAL LESION: ICD-10-CM

## 2025-08-15 PROCEDURE — 76775 US EXAM ABDO BACK WALL LIM: CPT

## 2025-08-21 ENCOUNTER — RESULTS FOLLOW-UP (OUTPATIENT)
Dept: UROLOGY | Age: 55
End: 2025-08-21
Payer: OTHER GOVERNMENT

## 2025-08-21 ENCOUNTER — TELEPHONE (OUTPATIENT)
Dept: UROLOGY | Age: 55
End: 2025-08-21
Payer: OTHER GOVERNMENT

## 2025-08-21 DIAGNOSIS — N28.9 RENAL LESION: Primary | ICD-10-CM

## (undated) DEVICE — PAD GRND REM POLYHESIVE A/ DISP

## (undated) DEVICE — Device

## (undated) DEVICE — SOLIDIFIER LIQLOC PLS 1500CC BT

## (undated) DEVICE — CONN JET HYDRA H20 AUXILIARY DISP

## (undated) DEVICE — SNAR POLYP CAPTIFLEX XS/OVL 11X2.4MM 240CM 1P/U

## (undated) DEVICE — LINER SURG CANSTR SXN S/RIGD 1500CC

## (undated) DEVICE — Device: Brand: DEFENDO AIR/WATER/SUCTION AND BIOPSY VALVE

## (undated) DEVICE — SOL IRRG H2O PL/BG 1000ML STRL